# Patient Record
Sex: FEMALE | Race: WHITE
[De-identification: names, ages, dates, MRNs, and addresses within clinical notes are randomized per-mention and may not be internally consistent; named-entity substitution may affect disease eponyms.]

---

## 2020-06-11 ENCOUNTER — HOSPITAL ENCOUNTER (OUTPATIENT)
Dept: HOSPITAL 75 - LABNPT | Age: 40
End: 2020-06-11
Attending: OBSTETRICS & GYNECOLOGY
Payer: COMMERCIAL

## 2020-06-11 ENCOUNTER — HOSPITAL ENCOUNTER (INPATIENT)
Dept: HOSPITAL 75 - ER | Age: 40
Discharge: HOME | DRG: 817 | End: 2020-06-11
Attending: OBSTETRICS & GYNECOLOGY | Admitting: OBSTETRICS & GYNECOLOGY
Payer: COMMERCIAL

## 2020-06-11 VITALS — SYSTOLIC BLOOD PRESSURE: 94 MMHG | DIASTOLIC BLOOD PRESSURE: 56 MMHG

## 2020-06-11 VITALS — SYSTOLIC BLOOD PRESSURE: 103 MMHG | DIASTOLIC BLOOD PRESSURE: 73 MMHG

## 2020-06-11 VITALS — DIASTOLIC BLOOD PRESSURE: 79 MMHG | SYSTOLIC BLOOD PRESSURE: 126 MMHG

## 2020-06-11 VITALS — BODY MASS INDEX: 31.64 KG/M2 | WEIGHT: 178.57 LBS | HEIGHT: 62.99 IN

## 2020-06-11 VITALS — DIASTOLIC BLOOD PRESSURE: 70 MMHG | SYSTOLIC BLOOD PRESSURE: 106 MMHG

## 2020-06-11 VITALS — DIASTOLIC BLOOD PRESSURE: 56 MMHG | SYSTOLIC BLOOD PRESSURE: 94 MMHG

## 2020-06-11 VITALS — DIASTOLIC BLOOD PRESSURE: 58 MMHG | SYSTOLIC BLOOD PRESSURE: 96 MMHG

## 2020-06-11 VITALS — DIASTOLIC BLOOD PRESSURE: 74 MMHG | SYSTOLIC BLOOD PRESSURE: 119 MMHG

## 2020-06-11 VITALS — DIASTOLIC BLOOD PRESSURE: 73 MMHG | SYSTOLIC BLOOD PRESSURE: 103 MMHG

## 2020-06-11 VITALS — SYSTOLIC BLOOD PRESSURE: 109 MMHG | DIASTOLIC BLOOD PRESSURE: 75 MMHG

## 2020-06-11 VITALS — DIASTOLIC BLOOD PRESSURE: 68 MMHG | SYSTOLIC BLOOD PRESSURE: 122 MMHG

## 2020-06-11 VITALS — DIASTOLIC BLOOD PRESSURE: 75 MMHG | SYSTOLIC BLOOD PRESSURE: 121 MMHG

## 2020-06-11 VITALS — DIASTOLIC BLOOD PRESSURE: 74 MMHG | SYSTOLIC BLOOD PRESSURE: 115 MMHG

## 2020-06-11 DIAGNOSIS — O08.1: ICD-10-CM

## 2020-06-11 DIAGNOSIS — Z11.59: Primary | ICD-10-CM

## 2020-06-11 DIAGNOSIS — K66.1: ICD-10-CM

## 2020-06-11 DIAGNOSIS — O00.101: Primary | ICD-10-CM

## 2020-06-11 LAB
ALBUMIN SERPL-MCNC: 4.5 GM/DL (ref 3.2–4.5)
ALP SERPL-CCNC: 53 U/L (ref 40–136)
ALT SERPL-CCNC: 14 U/L (ref 0–55)
APTT PPP: YELLOW S
BACTERIA #/AREA URNS HPF: (no result) /HPF
BASOPHILS # BLD AUTO: 0 10^3/UL (ref 0–0.1)
BASOPHILS NFR BLD AUTO: 0 % (ref 0–10)
BILIRUB SERPL-MCNC: 0.4 MG/DL (ref 0.1–1)
BILIRUB UR QL STRIP: NEGATIVE
BUN/CREAT SERPL: 12
CALCIUM SERPL-MCNC: 9.7 MG/DL (ref 8.5–10.1)
CHLORIDE SERPL-SCNC: 108 MMOL/L (ref 98–107)
CO2 SERPL-SCNC: 21 MMOL/L (ref 21–32)
CREAT SERPL-MCNC: 0.99 MG/DL (ref 0.6–1.3)
EOSINOPHIL # BLD AUTO: 0.1 10^3/UL (ref 0–0.3)
EOSINOPHIL NFR BLD AUTO: 2 % (ref 0–10)
ERYTHROCYTE [DISTWIDTH] IN BLOOD BY AUTOMATED COUNT: 14 % (ref 10–14.5)
FIBRINOGEN PPP-MCNC: (no result) MG/DL
GFR SERPLBLD BASED ON 1.73 SQ M-ARVRAT: > 60 ML/MIN
GLUCOSE SERPL-MCNC: 100 MG/DL (ref 70–105)
GLUCOSE UR STRIP-MCNC: NEGATIVE MG/DL
HCT VFR BLD CALC: 38 % (ref 35–52)
HGB BLD-MCNC: 13.1 G/DL (ref 11.5–16)
KETONES UR QL STRIP: NEGATIVE
LEUKOCYTE ESTERASE UR QL STRIP: (no result)
LYMPHOCYTES # BLD AUTO: 2 X 10^3 (ref 1–4)
LYMPHOCYTES NFR BLD AUTO: 33 % (ref 12–44)
MANUAL DIFFERENTIAL PERFORMED BLD QL: NO
MCH RBC QN AUTO: 30 PG (ref 25–34)
MCHC RBC AUTO-ENTMCNC: 34 G/DL (ref 32–36)
MCV RBC AUTO: 88 FL (ref 80–99)
MONOCYTES # BLD AUTO: 0.7 X 10^3 (ref 0–1)
MONOCYTES NFR BLD AUTO: 12 % (ref 0–12)
NEUTROPHILS # BLD AUTO: 3.3 X 10^3 (ref 1.8–7.8)
NEUTROPHILS NFR BLD AUTO: 53 % (ref 42–75)
NITRITE UR QL STRIP: NEGATIVE
PH UR STRIP: 6 [PH] (ref 5–9)
PLATELET # BLD: 392 10^3/UL (ref 130–400)
PMV BLD AUTO: 9.4 FL (ref 7.4–10.4)
POTASSIUM SERPL-SCNC: 4.1 MMOL/L (ref 3.6–5)
PROT SERPL-MCNC: 7.7 GM/DL (ref 6.4–8.2)
PROT UR QL STRIP: NEGATIVE
RBC #/AREA URNS HPF: (no result) /HPF
SODIUM SERPL-SCNC: 139 MMOL/L (ref 135–145)
SP GR UR STRIP: >=1.03 (ref 1.02–1.02)
SQUAMOUS #/AREA URNS HPF: (no result) /HPF
WBC # BLD AUTO: 6.1 10^3/UL (ref 4.3–11)
WBC #/AREA URNS HPF: (no result) /HPF

## 2020-06-11 PROCEDURE — 84702 CHORIONIC GONADOTROPIN TEST: CPT

## 2020-06-11 PROCEDURE — 87635 SARS-COV-2 COVID-19 AMP PRB: CPT

## 2020-06-11 PROCEDURE — 96374 THER/PROPH/DIAG INJ IV PUSH: CPT

## 2020-06-11 PROCEDURE — 76817 TRANSVAGINAL US OBSTETRIC: CPT

## 2020-06-11 PROCEDURE — 85025 COMPLETE CBC W/AUTO DIFF WBC: CPT

## 2020-06-11 PROCEDURE — 80053 COMPREHEN METABOLIC PANEL: CPT

## 2020-06-11 PROCEDURE — 0UT74ZZ RESECTION OF BILATERAL FALLOPIAN TUBES, PERCUTANEOUS ENDOSCOPIC APPROACH: ICD-10-PCS | Performed by: OBSTETRICS & GYNECOLOGY

## 2020-06-11 PROCEDURE — 76801 OB US < 14 WKS SINGLE FETUS: CPT

## 2020-06-11 PROCEDURE — 0WCG4ZZ EXTIRPATION OF MATTER FROM PERITONEAL CAVITY, PERCUTANEOUS ENDOSCOPIC APPROACH: ICD-10-PCS | Performed by: OBSTETRICS & GYNECOLOGY

## 2020-06-11 PROCEDURE — 87088 URINE BACTERIA CULTURE: CPT

## 2020-06-11 PROCEDURE — 81000 URINALYSIS NONAUTO W/SCOPE: CPT

## 2020-06-11 PROCEDURE — 96376 TX/PRO/DX INJ SAME DRUG ADON: CPT

## 2020-06-11 PROCEDURE — 84703 CHORIONIC GONADOTROPIN ASSAY: CPT

## 2020-06-11 PROCEDURE — 88305 TISSUE EXAM BY PATHOLOGIST: CPT

## 2020-06-11 PROCEDURE — 96361 HYDRATE IV INFUSION ADD-ON: CPT

## 2020-06-11 PROCEDURE — 0UB14ZZ EXCISION OF LEFT OVARY, PERCUTANEOUS ENDOSCOPIC APPROACH: ICD-10-PCS | Performed by: OBSTETRICS & GYNECOLOGY

## 2020-06-11 PROCEDURE — 36415 COLL VENOUS BLD VENIPUNCTURE: CPT

## 2020-06-11 NOTE — NUR
DISCHARGE INSTRUCTIONS REVIEWED WITH COPY TO PT. SPOUSE LEFT TO GET PRESCRIPTION. WILL MEET 
US DOWNSTAIRS AT ED ENTRANCE WHEN RETURNS. STATES UNDERSTANDING OF ALL INSTRUCTIONS AND NEED 
TO F/U AS INSTRUCTED. PT C/O SLIGHT NAUSEA AND REQUESTS MEDICATION FOR HOME IF NEEDED.

## 2020-06-11 NOTE — XMS REPORT
CCD document using C-CDA

                             Created on: 2019



Suzy Howard

External Reference #: [id number]

: 1980

Sex: Female



Demographics





                          Address                   [address] [place]  [address]

 

                          Home Phone                [phone]

 

                          Preferred Language        English

 

                          Marital Status            

 

                          Jehovah's witness Affiliation     Unknown

 

                          Race                      White

 

                          Ethnic Group              Not  or 





Author





                          Author                    Suzy Shipman D.O.

 

                          Organization              [facility]

 

                          Address                   [address] [place]  [address]



 

                          Phone                     [phone]







Care Team Providers





                    Care Team Member Name Role                Phone

 

                          PP                        Unavailable

 

                          CCM                       Unavailable



                                            



Summary Purpose

          Interface Exchange                                                    
               



Insurance Providers

                      



                    Payer name                   Policy type / Coverage type    

               

Covered party ID                   Effective Begin Date                   

Effective End Date                

 

                    [facility]                   Commercial Insurance 

                  

[id number]                   2013                   Unknown                



                                                                              



Family History

          Family History data not found                                         
                



Social History

                      



                    Social History Element                   Codes              

     Description    

                                        Effective Dates                

 

                    Tobacco history                   SNOMED CT: [id number]      

             Never 

smoker                                  2011                



                                                                                
       



Allergies, Adverse Reactions, Alerts

                      



                Substance                   Reaction                   Codes    

               

Entered Date                   Inactivated Date                   Status        

       

 

                                                            * NO KNOWN FOOD FAM

RGIES                                   

                                    Unknown                   2011        

           No 

Inactive Date                           Active                

 

                                                            * NO KNOWN ENVIRONME

NTAL ALLERGIES                          

                                        Unknown                   2011    

          

                          No Inactive Date                   Active             

   

 

                                                            * NO KNOWN DRUG FAM

RGIES                                   

                                    Unknown                   2011        

           No 

Inactive Date                           Active                



                                                                                
                 



Past Medical History

                      



                    Illness                   Codes                   Condition 

Status              

                          Onset Date                   Resolved Date            

    

 

                                              FLU VACCINE                       

              ICD-9: 

V04.81                   Active                   2013                   

Unknown                

 

                                              GERD                              

       ICD-9: 530.81      

                    Active                   2012                   Unknow

n       

        

 

                                              LYMPHADENOPATHY                   

                  ICD-9: 

785.6                   Active                   2012                   

Unknown                

 

                                              Hypertension                      

               Unknown    

                    Active                   2011                   Unknow

n     

          

 

                                              CEPHALGIA                         

            ICD-9: 784.0  

                    Active                   2011                   Unknow

n   

            

 

                                              HYPERTENSION                      

               ICD-9: 

401.9                   Active                   2011                   

Unknown                

 

                                              MALAISE AND FATIGUE               

                      ICD-

9: 780.79                   Active                    2011                

 

                                        Unknown                

 

                                              Lupus                             

        Unknown           

                    Active                   2011                   Unknow

n            

   

 

                                              INSECT BITE HIP/LEG               

                      ICD-

9: 916.4                   Active                    2011                 

 

                                        Unknown                

 

                                              SCARLET FEVER                     

                ICD-9: 

034.1                   Active                   2011                   

Unknown                

 

                                                            SYST LUPUS ERYTHEMAT

OSUS                                    

                    ICD-9: 710.0                   Active                                 

                                        Unknown                



                                                                                
                                                                                
                          



Problems

                      



                    Condition                   Codes                   Effectiv

e Dates             

                                        Condition Status                

 

                                              FLU VACCINE                       

              ICD-9: 

V04.81                    2013                   Active                

 

                                              GERD                              

       ICD-9: 530.81      

                          2012                   Active                

 

                                              LYMPHADENOPATHY                   

                  ICD-9: 

785.6                     2012                   Active                

 

                                              Hypertension                      

               Unknown    

                          2011                   Active                

 

                                              CEPHALGIA                         

            ICD-9: 784.0  

                          2011                   Active                

 

                                              HYPERTENSION                      

               ICD-9: 

401.9                     2011                   Active                

 

                                              MALAISE AND FATIGUE               

                      ICD-

9: 780.79                   2011                   Active                

 

                                              Lupus                             

        Unknown           

                          2011                   Active                

 

                                              INSECT BITE HIP/LEG               

                      ICD-

9: 916.4                   2011                   Active                

 

                                              SCARLET FEVER                     

                ICD-9: 

034.1                     2011                   Active                

 

                                                            SYST LUPUS ERYTHEMAT

OSUS                                    

                    ICD-9: 710.0                   2011                   

Active              

 



                                                                                
                                                                                
                          



Medications

                      



                    Medication                   Codes                   Instruc

tions               

                    Start Date                   Stop Date                   Sta

tus              

                                        Fill Instructions                

 

                                              prednisone 20 mg Tab              

                       

RxNorm: 903990                   1 Tablet(s) PO BID                   2011                   Inactive                       

  

         

 

                                              Plaquenil 200 mg Tab              

                       

RxNorm: 503991                   1 Tablet(s) PO QD                   No Start 

Date                                       Active                               

   

 

                                              amoxicillin 500 mg Tab            

                         

RxNorm: 080260                   2 Tablet(s) PO BID                   No Start 

Date                   2011                   Inactive                    

              



                                                                                
                 



Medication Administered

          No Medication Administered data                                       
                            



Immunizations

                      



                Vaccine                   Codes                   Date          

         Status 

              

 

                    Influenza                   CVX: 141                                  

                                        completed                

 

                    Pediatric Influenza                   CVX: 141              

     2013     

                                        completed                

 

                    Tetanus, Diphtheria, Pertussis                   Unknown    

               

2011                              completed                



                                                                                
                 



Assessments

                      



                    Condition                   Codes                   Effectiv

e Dates             

  

 

                    FLU VACCINE                   ICD-9: V04.81                 

  2013        

       

 

                    GERD                   ICD-9: 530.81                                  



 

                    LYMPHADENOPATHY                   ICD-9: 785.6              

     2012     

          

 

                    CEPHALGIA                   ICD-9: 784.0                   0

2011           

    

 

                    HYPERTENSION                   ICD-9: 401.9                 

  2011        

       

 

                    SYST LUPUS ERYTHEMATOSUS                   ICD-9: 710.0     

              

2011                

 

                    MALAISE AND FATIGUE                   ICD-9: 780.79         

          2011

               

 

                    INSECT BITE HIP/LEG                   ICD-9: 916.4          

         2011 

              

 

                    SCARLET FEVER                   ICD-9: 034.1                

   2011       

        



                                                                    



Reason For Visit

                      



                    Reason For Visit                   Effective Dates          

         Notes      

         

 

                    injection(s)                   2013                   

                

 

                    gastroesophageal reflux                   2012        

                    

      

 

                    high blood pressure                   2011            

                    

  

 

                    follow up                   2011                   fro

m [facility]        

       



                                                                              



Results

                      



                    Observation                   Observation Code              

     Item           

                    Item Code                   Result                   Date   

             

 

                    COMPREHENSIVE METABOLIC                   06866             

      AST           

                                        19 U/L                   2012     

           

 

                    COMPREHENSIVE METABOLIC                   01987             

      ALT           

                                        13 IU/L                   2012    

            

 

                    COMPREHENSIVE METABOLIC                   31471             

      BUN           

                                        14 MG/DL                   2012   

             

 

                    COMPREHENSIVE METABOLIC                   80151             

      ALBUMIN       

                                        4.8 GM/DL                   2012  

          

   

 

                    COMPREHENSIVE METABOLIC                   14206             

      CHLORIDE      

                                        107 MMOL/L                   2012 

         

     

 

                    COMPREHENSIVE METABOLIC                   26039             

      BILI TOT      

                                        0.6 MG/DL                   2012  

         

    

 

                    COMPREHENSIVE METABOLIC                   69868             

      ALK PHOS      

                                        46 U/L                   2012     

         

 

 

                    COMPREHENSIVE METABOLIC                   44791             

      SODIUM        

                                        140 MMOL/L                   2012 

           

   

 

                    COMPREHENSIVE METABOLIC                   40828             

      CREATININE    

                                        0.82 MG/DL                   2012 

       

       

 

                    COMPREHENSIVE METABOLIC                   11621             

      CALCIUM       

                                        9.5 MG/DL                   2012  

          

   

 

                    COMPREHENSIVE METABOLIC                   54561             

      POTASSIUM     

                                        3.8 MMOL/L                   2012 

        

      

 

                    COMPREHENSIVE METABOLIC                   83367             

      PROT TOT      

                                        6.9 GM/DL                   2012  

         

    

 

                    COMPREHENSIVE METABOLIC                   94970             

      Glucose       

                                        88 MG/DL                   2012   

          

  

 

                    COMPREHENSIVE METABOLIC                   46790             

      BICARB        

                                        26 MMOL/L                   2012  

           

  

 

                    COMPREHENSIVE METABOLIC                   48388             

      ANION GAP     

                                        7 MEQ/L                   2012    

        

   

 

                    COMPLETE BLOOD COUNT                   5055798              

     WBC            

                                        3.6 10e9/L                   2012 

               

 

                    COMPLETE BLOOD COUNT                   5088805              

     RBC            

                                        3.89 10e12/L                   

2              

 

 

                    COMPLETE BLOOD COUNT                   [id number]              

     HGB            

                                        11.8 g/dL                   2012  

              

 

                    COMPLETE BLOOD COUNT                   [id number]              

     HCT DET        

                                        35.3 %                   2012     

           

 

                    COMPLETE BLOOD COUNT                   9027829              

     MCV            

                                        90.7 fL                   2012    

            

 

                    COMPLETE BLOOD COUNT                   1190165              

     MCH            

                                        30.3 pg                   2012    

            

 

                    COMPLETE BLOOD COUNT                   3196876              

     MCHC           

                                        33.4 g/dL                   2012  

              

 

                    COMPLETE BLOOD COUNT                   [id number]              

     PLT            

                                        331 10e9/L                   2012 

               

 

                    COMPLETE BLOOD COUNT                   1329155              

     MPV            

                                        9.7 fL                   2012     

           

 

                    COMPLETE BLOOD COUNT                   [id number]              

     CATRINA %          

                                        54.0 %                   2012     

           

 

                    COMPLETE BLOOD COUNT                   4338511              

     LY %           

                                        32.8 %                   2012     

           

 

                    COMPLETE BLOOD COUNT                   [id number]              

     MON %          

                                        11.0 %                   2012     

           

 

                    COMPLETE BLOOD COUNT                   3943115              

     EOS  %         

                                        1.9 %                   2012      

          

 

                    COMPLETE BLOOD COUNT                   [id number]              

     BASO %         

                                        0.3 %                   2012      

          

 

                    COMPLETE BLOOD COUNT                   4856716              

     RDW            

                                        12.9 %                   2012     

           

 

                    COMPLETE BLOOD COUNT                   [id number]              

     ABS CATRINA        

                                        1.94 10e9/L                   2012

           

    

 

                    COMPLETE BLOOD COUNT                   1565301              

     ABS LYMPH      

                                        1.18 10e9/L                   2012

         

      

 

                    COMPLETE BLOOD COUNT                   [id number]              

     ABS MONO       

                                        0.40 10e9/L                   2012

          

     

 

                    COMPLETE BLOOD COUNT                   5849460              

     ABS EOS        

                                        0.07 10e9/L                   2012

           

    

 

                    COMPLETE BLOOD COUNT                   8665687              

     ABS BASO       

                                        0.01 10e9/L                   2012

          

     

 

                    COMPLETE BLOOD COUNT                   3231005              

     RDW-SD         

                                        42.3 fL                   2012    

            

 

                GFR CALC                   7043779                   GFR AA     

                

                          >60 ML/MIN                   2012               

 

 

                    GFR CALC                   7680692                   GFR NON

-AA                 

                                        >60 ML/MIN                   2012 

               

 

                FREE T4                   39038                   FREE T4       

                

                          1.27 NG/DL                   2011               

 

 

                    COMPREHENSIVE METABOLIC                   04886             

      AST           

                                        20 U/L                   2011     

           

 

                    COMPREHENSIVE METABOLIC                   70325             

      ALT           

                                        17 IU/L                   2011    

            

 

                    COMPREHENSIVE METABOLIC                   80987             

      BUN           

                                        14 MG/DL                   2011   

             

 

                    COMPREHENSIVE METABOLIC                   19768             

      ALBUMIN       

                                        4.9 GM/DL                   2011  

          

   

 

                    COMPREHENSIVE METABOLIC                   04756             

      CHLORIDE      

                                        105 MMOL/L                   2011 

         

     

 

                    COMPREHENSIVE METABOLIC                   45736             

      BILI TOT      

                                        0.6 MG/DL                   2011  

         

    

 

                    COMPREHENSIVE METABOLIC                   27747             

      ALK PHOS      

                                        40 U/L                   2011     

         

 

 

                    COMPREHENSIVE METABOLIC                   46841             

      SODIUM        

                                        137 MMOL/L                   2011 

           

   

 

                    COMPREHENSIVE METABOLIC                   09575             

      CREATININE    

                                        0.79 MG/DL                   2011 

       

       

 

                    COMPREHENSIVE METABOLIC                   19760             

      CALCIUM       

                                        9.3 MG/DL                   2011  

          

   

 

                    COMPREHENSIVE METABOLIC                   85145             

      POTASSIUM     

                                        4.0 MMOL/L                   2011 

        

      

 

                    COMPREHENSIVE METABOLIC                   07599             

      PROT TOT      

                                        7.2 GM/DL                   2011  

         

    

 

                    COMPREHENSIVE METABOLIC                   14502             

      Glucose       

                                        90 MG/DL                   2011   

          

  

 

                    COMPREHENSIVE METABOLIC                   00980             

      BICARB        

                                        23 MMOL/L                   2011  

           

  

 

                    COMPREHENSIVE METABOLIC                   07854             

      ANION GAP     

                                        9 MEQ/L                   2011    

        

   

 

                    THYROID STIMULATING HORMONE                   25684         

          TSH       

                                        0.793 uIU/ML                   

1         

      

 

                    COMPLETE BLOOD COUNT                   88798                

   WBC              

                                        6.7 10e9/L                   2011 

               

 

                    COMPLETE BLOOD COUNT                   02751                

   RBC              

                                        4.26 10e12/L                   

1                

 

                    COMPLETE BLOOD COUNT                   61074                

   HGB              

                                        12.7 g/dL                   2011  

              

 

                    COMPLETE BLOOD COUNT                   02681                

   HCT DET          

                                        36.9 %                   2011     

           

 

                    COMPLETE BLOOD COUNT                   31031                

   MCV              

                                        86.6 fL                   2011    

            

 

                    COMPLETE BLOOD COUNT                   98426                

   MCH              

                                        29.8 pg                   2011    

            

 

                    COMPLETE BLOOD COUNT                   64968                

   MCHC             

                                        34.4 g/dL                   2011  

              

 

                    COMPLETE BLOOD COUNT                   83543                

   PLT              

                                        318 10e9/L                   2011 

               

 

                    COMPLETE BLOOD COUNT                   40988                

   MPV              

                                        9.4 fL                   2011     

           

 

                    COMPLETE BLOOD COUNT                   42907                

   [place] %            

                                        64.3 %                   2011     

           

 

                    COMPLETE BLOOD COUNT                   81449                

   LY %             

                                        25.0 %                   2011     

           

 

                    COMPLETE BLOOD COUNT                   32180                

   MON %            

                                        9.1 %                   2011      

          

 

                    COMPLETE BLOOD COUNT                   15628                

   EOS  %           

                                        1.5 %                   2011      

          

 

                    COMPLETE BLOOD COUNT                   63438                

   BASO %           

                                        0.1 %                   2011      

          

 

                    COMPLETE BLOOD COUNT                   82894                

   RDW              

                                        13.2 %                   2011     

           

 

                    COMPLETE BLOOD COUNT                   96494                

   ABS [place]          

                                        4.31 10e9/L                   2011

             

  

 

                    COMPLETE BLOOD COUNT                   95777                

   ABS LYMPH        

                                        1.68 10e9/L                   2011

           

    

 

                    COMPLETE BLOOD COUNT                   55702                

   ABS MONO         

                                        0.61 10e9/L                   2011

            

   

 

                    COMPLETE BLOOD COUNT                   61071                

   ABS EOS          

                                        0.10 10e9/L                   2011

             

  

 

                    COMPLETE BLOOD COUNT                   76011                

   ABS BASO         

                                        0.01 10e9/L                   2011

            

   

 

                    COMPLETE BLOOD COUNT                   32300                

   RDW-SD           

                                        41.2 fL                   2011    

            

 

                GFR CALC                   [id number]                   GFR AA     

                

                          >60 ML/MIN                   2011               

 

 

                    GFR CALC                   [id number]                   GFR NON

-AA                 

                                        >60 ML/MIN                   2011 

               



                                                                                
                                                                   



Review of Systems

                      



                    System                   Result                   Effective 

Dates               



 

                    Constitutional                   fever                                

  

 

                    Ears/Nose/Throat/Neck                   No nasal discharge  

                 

2012                

 

                    Ears/Nose/Throat/Neck                   No otalgia          

         2012 

              

 

                    Ears/Nose/Throat/Neck                   sinus congestion    

               

2012                

 

                    Ears/Nose/Throat/Neck                   No sore throat      

             

2012                

 

                    Cardiovascular                   No chest pain/pressure     

              

2012                

 

                    Cardiovascular                   No hypertension            

       2012   

            

 

                    Respiratory                   No cough                                

  

 

                    Respiratory                   No chest congestion           

        2012  

             

 

                    Respiratory                   No cigarette smoking          

         2012 

              

 

                    Gastrointestinal                   gastroesophageal reflux  

                 

2012                

 

                    Gastrointestinal                   No vomiting              

     2012     

          

 

                    Gastrointestinal                   No nausea                

   2012       

        

 

                    Gastrointestinal                   constipation             

      2012    

           

 

                    Genitourinary/Nephrology                   No pregnancy     

              

2012                

 

                    Dermatologic                   No rash                                

  

 

                    Dermatologic                   No sores                               

   

 

                    Cardiovascular                   hypertension               

    2011      

         

 

                    Neurologic                   No memory loss                 

  2011        

       

 

                    Ears/Nose/Throat/Neck                   dental pain         

          2011

               

 

                    Constitutional                   fatigue                   0

2011           

    

 

                    Respiratory                   No asthma                               

   

 

                    Respiratory                   No pleuritic pain             

      2011    

           

 

                    Respiratory                   No productive sputum          

         2011 

              

 

                    Respiratory                   No cough                   2011             

  

 

                    Respiratory                   No dyspnea                   0

2011           

    

 

                    Respiratory                   No wheezing                   

2011          

     

 

                    Gastrointestinal                   No hemorrhoids           

        2011  

             

 

                    Gastrointestinal                   No hepatitis             

      2011    

           

 

                    Gastrointestinal                   No abdominal pain        

           

2011                

 

                    Gastrointestinal                   No constipation          

         2011 

              

 

                    Gastrointestinal                   No diarrhea              

     2011     

          

 

                    Gastrointestinal                   No gastroesophageal reflu

x                   

2011                

 

                    Gastrointestinal                   No melena                

   2011       

        

 

                    Gastrointestinal                   No nausea                

   2011       

        

 

                    Gastrointestinal                   No vomiting              

     2011     

          

 

                    Genitourinary/Nephrology                   No dysuria       

            

2011                

 

                    Genitourinary/Nephrology                   No nocturia      

             

2011                

 

                          Genitourinary/Nephrology                   No urinary 

incontinence              

                                        2011                

 

                    Musculoskeletal                   No muscle weakness        

           

2011                

 

                    Musculoskeletal                   No myalgias               

    2011      

         

 

                    Musculoskeletal                   No stiffness              

     2011     

          

 

                    Musculoskeletal                   No swelling               

    2011      

         

 

                    Dermatologic                   No rash                   2011             

  

 

                    Dermatologic                   No scar                   2011             

  

 

                    Psychiatric                   No anxiety                   0

2011           

    

 

                    Psychiatric                   No depression                 

  2011        

       

 

                    Endocrine                   No goiter                   2011              

 

 

                    Endocrine                   No hyperglycemia                

   2011       

        

 

                    Endocrine                   No hypoglycemia                 

  2011        

       

 

                    Psychiatric                   stress                                  



 

                    Dermatologic                   arthropod bite               

    2011      

         

 

                    Dermatologic                   rash                   2011                

 

                    Constitutional                   No night sweats            

       2011   

            

 

                    Constitutional                   No fatigue                 

  2011        

       

 

                    Constitutional                   No fever                   

2011          

     

 

                    Constitutional                   No insomnia                

   2011       

        

 

                    Constitutional                   No weight loss             

      2011    

           



                                                                    



Physical Exam

                      



                    Exam Name                   System Name                   It

em Name             

                    Status                   Result                   Effective 

Dates          

                                        Notes                

 

                    Full Exam - General                   Constitutional        

            general 

appearance                   Overall:                   well nourished          

                          2012                   None                

 

                    Full Exam - General                   Constitutional        

            general 

appearance                   Overall:                   well developed          

                          2012                   None                

 

                    Full Exam - General                   Constitutional        

            general 

appearance                   Overall:                   in no acute distress    

                          2012                   None                

 

                    Full Exam - General                   Ears/Nose/Throat      

             

otoscopic exam                   Overall:                   external auditory 

canals clear                   2012                   None                

 

                    Full Exam - General                   Ears/Nose/Throat      

             

otoscopic exam                   Overall:                   tympanic membranes 

clear                     2012                   None                

 

                    Full Exam - General                   Ears/Nose/Throat      

             

lips/teeth/gingiva                   Overall:                   benign lips     

                          2012                   None                

 

                    Full Exam - General                   Ears/Nose/Throat      

             

lips/teeth/gingiva                   Overall:                   normal dentition

                          2012                   None                

 

                    Full Exam - General                   Ears/Nose/Throat      

             

lips/teeth/gingiva                   Overall:                   benign gingiva  

                          2012                   None                

 

                    Full Exam - General                   Ears/Nose/Throat      

             oral 

cavity/pharynx/larynx                    Overall:                   oral mucosa 

clear                     2012                   None                

 

                    Full Exam - General                   Ears/Nose/Throat      

             oral 

cavity/pharynx/larynx                    Overall:                   tonsils 

benign                    2012                   None                

 

                    Full Exam - General                   Ears/Nose/Throat      

             oral 

cavity/pharynx/larynx                    Overall:                   

oropharyngeal mucosa clear                   2012                   None  

             

 

                    Full Exam - General                   Respiratory           

        auscultation

                          Overall:                   breath sounds clear bilater

ally    

                          2012                   None                

 

                    Full Exam - General                   Respiratory           

        respiratory 

effort/rhythm                   Overall:                   no retractions       

                          2012                   None                

 

                    Full Exam - General                   Respiratory           

        respiratory 

effort/rhythm                   Overall:                   normal rate          

                          2012                   None                

 

                    Full Exam - General                   Cardiovascular        

           

auscultation of heart                   Overall:                   regular rate 

                          2012                   None                

 

                    Full Exam - General                   Cardiovascular        

           

auscultation of heart                   Overall:                   normal heart 

sounds                    2012                   None                

 

                    Full Exam - General                   Lymphatic             

      neck nodes    

                          Overall:                   anterior cervical chain minh

ign         

                          2012                   None                

 

                    Full Exam - General                   Lymphatic             

      neck nodes    

                          Overall:                   posterior cervical chain be

nign        

                          2012                   None                

 

                    Full Exam - General                   Lymphatic             

      axilla/arm 

nodes                     Left axillary:                   a normal exam        

  

                          2012                   None                

 

                    Full Exam - General                   Lymphatic             

      axilla/arm 

nodes                     Right axillary:                   number of palpable 

nodes: 1                   2012                   None                

 

                    Full Exam - General                   Lymphatic             

      axilla/arm 

nodes                     Right axillary:                   size (cm): 0.5      

  

                          2012                   None                

 

                    Full Exam - General                   Lymphatic             

      axilla/arm 

nodes                     Right axillary:                   tender              

  

                          2012                   None                

 

                    Full Exam - General                   Lymphatic             

      axilla/arm 

nodes                     Right axillary:                   mobile              

  

                          2012                   None                

 

                    Full Exam - General                   Constitutional        

            general 

appearance                   Overall:                   in no acute distress    

                          2011                   None                

 

                    Full Exam - General                   Constitutional        

            general 

appearance                   Overall:                   well developed          

                          2011                   None                

 

                    Full Exam - General                   Constitutional        

            general 

appearance                   Overall:                   well nourished          

                          2011                   None                

 

                    Full Exam - General                   Neurologic            

       mental status

                    Overall:                   alert                   

1 

                                        None                

 

                    Full Exam - General                   Neurologic            

       mental status

                    Overall:                   oriented                   

2011                              None                

 

                    Full Exam - General                   Psychiatric           

        mood and 

affect                   Affect:                   flat                   

2011                              None                

 

                    Full Exam - General                   Psychiatric           

        mood and 

affect                   Mood:                   normal                   

2011                              None                

 

                    Full Exam - General                   Musculoskeletal       

            spine, 

ribs and pelvis                   Spine:                    tender @ cervical 

spine                     2011                   None                

 

                    Full Exam - General                   Respiratory           

        auscultation

                          Right upper lung field:                   a normal exa

m       

                          2011                   None                

 

                    Full Exam - General                   Respiratory           

        auscultation

                          Right middle lung field:                   a normal ex

am      

                          2011                   None                

 

                    Full Exam - General                   Respiratory           

        auscultation

                          Right lower lung field:                   a normal exa

m       

                          2011                   None                

 

                    Full Exam - General                   Respiratory           

        auscultation

                          Left lower lung field:                   a normal exam

        

                          2011                   None                

 

                    Full Exam - General                   Respiratory           

        auscultation

                          Overall:                   breath sounds clear bilater

ally    

                          2011                   None                

 

                    Full Exam - General                   Respiratory           

        auscultation

                          Left upper lung field:                   a normal exam

        

                          2011                   None                

 

                    Full Exam - General                   Respiratory           

        auscultation

                    Diffuse:                   a normal exam                   

2011                              None                

 

                    Full Exam - General                   Cardiovascular        

           

auscultation of heart                   Overall:                   no murmurs   

                          2011                   None                

 

                    Full Exam - General                   Cardiovascular        

           

auscultation of heart                   Overall:                   regular rate 

                          2011                   None                

 

                    Full Exam - General                   Cardiovascular        

           

auscultation of heart                   Overall:                   normal heart 

sounds                    2011                   None                

 

                    Full Exam - General                   Cardiovascular        

           

auscultation of heart                   S1:                       a normal exam 

    

                          2011                   None                

 

                    Full Exam - General                   Cardiovascular        

           

auscultation of heart                   S2:                       a normal exam 

    

                          2011                   None                

 

                    Full Exam - General                   Cardiovascular        

           

auscultation of heart                   Rhythm:                   regular rhythm

                          2011                   None                

 

                    Full Exam - General                   Cardiovascular        

           

auscultation of heart                   Rate:                     regular rate  

  

                          2011                   None                

 

                    Full Exam - General                   Cardiovascular        

           

auscultation of heart                   S3 (ventricular gallop):                

                    present                   2011                   None 

               

 

                    Full Exam - General                   Psychiatric           

        mood and 

affect                    Overall:                   normal mood and affect     

 

                          2011                   None                

 

                    Full Exam - General                   Respiratory           

        auscultation

                          Overall:                   breath sounds clear bilater

ally    

                          2011                   None                

 

                    Full Exam - General                   Respiratory           

        auscultation

                    Diffuse:                   a normal exam                   

2011                              None                

 

                    Full Exam - General                   Respiratory           

        auscultation

                          Left upper lung field:                   a normal exam

        

                          2011                   None                

 

                    Full Exam - General                   Respiratory           

        auscultation

                          Left lower lung field:                   a normal exam

        

                          2011                   None                

 

                    Full Exam - General                   Respiratory           

        auscultation

                          Right upper lung field:                   a normal exa

m       

                          2011                   None                

 

                    Full Exam - General                   Respiratory           

        auscultation

                          Right middle lung field:                   a normal ex

am      

                          2011                   None                

 

                    Full Exam - General                   Respiratory           

        auscultation

                          Right lower lung field:                   a normal exa

m       

                          2011                   None                

 

                    Full Exam - General                   Cardiovascular        

           

auscultation of heart                   Overall:                   regular rate 

                          2011                   None                

 

                    Full Exam - General                   Cardiovascular        

           

auscultation of heart                   Overall:                   normal heart 

sounds                    2011                   None                

 

                    Full Exam - General                   Cardiovascular        

           

auscultation of heart                   Overall:                   no murmurs   

                          2011                   None                

 

                    Full Exam - General                   Cardiovascular        

           

auscultation of heart                   Rate:                     regular rate  

  

                          2011                   None                

 

                    Full Exam - General                   Cardiovascular        

           

auscultation of heart                   Rhythm:                   regular rhythm

                          2011                   None                

 

                    Full Exam - General                   Cardiovascular        

           

auscultation of heart                   S1:                       a normal exam 

    

                          2011                   None                

 

                    Full Exam - General                   Cardiovascular        

           

auscultation of heart                   S2:                       a normal exam 

    

                          2011                   None                

 

                    Full Exam - General                   Constitutional        

            general 

appearance                   Overall:                   well nourished          

                          2011                   None                

 

                    Full Exam - General                   Constitutional        

            general 

appearance                   Overall:                   well developed          

                          2011                   None                

 

                    Full Exam - General                   Constitutional        

            general 

appearance                   Overall:                   in no acute distress    

                          2011                   None                

 

                    Full Exam - General                   Neurologic            

       mental status

                    Overall:                   alert                   

1 

                                        None                

 

                    Full Exam - General                   Neurologic            

       mental status

                    Overall:                   oriented                   

2011                              None                

 

                    Full Exam - General                   Cardiovascular        

           

auscultation of heart                   S3 (ventricular gallop):                

                    present                   2011                   None 

               

 

                    Full Exam - General                   Integument            

       inspection of

skin                   Dermatitis:                   erythema                   

2011                              scarletina like rash to groins, axilla, 

under 

breasts                

 

                    Full Exam - General                   Integument            

       inspection of

skin                   Location:                   right leg                   

2011                              inner thigh with approx 2cm ecchymotic l

ooking area

with pustule and induratin and surrounding erythema                



                                                                              



Procedures

                      



                    Procedure                   Codes                   Date    

            

 

                                                            FLU VACCINE 3 YRS & 

> IM UP 66                              

                          CPT-4: 98885                   2013             

   

 

                                              IMMUNIZATION ADMIN                

                     CPT-

4: 78520                                2013                

 

                                              ROUTINE VENIPUNCTURE              

                       

CPT-4: 98969                            2012                

 

                                                            COMPREHEN METABOLIC 

PANEL                                   

                          CPT-4: 87262                   2012             

   

 

                                                            COMPLETE CBC W/AUTO 

DIFF WBC                                

                          CPT-4: 25178                   2012             

   

 

                                              ROUTINE VENIPUNCTURE              

                       

CPT-4: 37143                            2011                

 

                                              ASSAY OF FREE THYROXINE           

                          

CPT-4: 41274                            2011                

 

                                                            ASSAY THYROID STIM H

ORMONE                                  

                          CPT-4: 51441                   2011             

   

 

                                                            COMPREHEN METABOLIC 

PANEL                                   

                          CPT-4: 54270                   2011             

   

 

                                                            COMPLETE CBC W/AUTO 

DIFF WBC                                

                          CPT-4: 73264                   2011             

   



                                                                                
                                                                                
      



Vital Signs

                      



                          Date                      Vital                

 

                          2012                                       Blood

 Pressure 1: 124/82 Code: 

8480-6                                                         BMI: 25.5 Code: 

63186-5                                                         Heart Rate 1: 68

bpm                                                         Height: 5'4"        

                                                            Temperature: 37.2 (C

) / 99.0 (F)

                                                            Weight: 151 lbs     

    

                         

 

                          2011                                       Blood

 Pressure 1: 116/78 Code: 

8480-6                                                         Heart Rate 1: 72 

bpm                                  

 

                          2011                                       Blood

 Pressure 1: 136/80 Code: 

8480-6                                                         BMI: 27.5 Code: 

46447-1                                                         Heart Rate 1: 60

bpm                                                         Height: 5'4"        

                                                            Temperature: 36.6 (C

) / 97.8 (F)

                                                            Weight: 163 lbs     

    

                         

 

                          2011                                       Blood

 Pressure 1: 128/80 Code: 

8480-6                                                         Heart Rate 1: 76 

bpm                                                         Temperature: 36.7 

(C) / 98.0 (F)                                                         Weight: 

160 lbs                                   



                                                                                
                                     



Functional Status

          No Functional Status data                                             
            



History of Present Illness

                      



                    Symptom Name                   Status                   Resu

lt                  

                          Effective Date                   Notes                

 

                    gastroesophageal reflux                   Quality           

        constant    

                          2012                   None                

 

                    gastroesophageal reflux                   Quality           

        heartburn   

                          2012                   None                

 

                          gastroesophageal reflux                   Onset and Re

solution                  

                    ongoing                   2012                   None 

               

 

                    gastroesophageal reflux                   Onset of Symptom  

                 

during adulthood                   2012                   None            

   

 

                    gastroesophageal reflux                   Quality           

        worsening   

                          2012                   None                

 

                    myalgias                   Location                   on the

 right arm          

                          2012                   None                

 

                    myalgias                   Location                   on the

 left arm           

                          2012                   underneath both arms at p

it area            

   

 

                myalgias                   Quality                   aching     

              

2012                              None                

 

                myalgias                   Quality                   constant   

                

2012                              None                

 

                myalgias                   Quality                   burning    

               

2012                              None                

 

                    myalgias                   Quality                   tendern

ess                 

                          2012                   None                

 

                myalgias                   Quality                   warmth     

              

2012                              None                

 

                    myalgias                   Onset and Resolution             

      sudden in 

onset                     2012                   None                

 

                    myalgias                   Onset of Symptom                 

  1 months ago      

                          2012                   None                

 

                    myalgias                   Limitation on Activities         

          moderately

limits activities                   2012                   None           

    

 

                    myalgias                   Frequency of Episodes            

       increasing   

                          2012                   None                

 

                    hypertension                   Blood Pressure Values        

           Stage 

0:-139 mmHg / DBP 85-89 mmHg                   2011                

                                        with bradycardia                

 

                headache                   Quality                   pressure   

                

2011                              None                

 

                    fatigue                   Onset and Resolution              

     ongoing        

                          2011                   None                

 

                    sores                   Location-Extremities                

   on the right 

thigh                     2011                   None                

 

                    rash                   Location-Major                   in t

he groin area       

                          2011                   None                

 

                    rash                   Location-Major                   on t

he chest            

                          2011                   None                



                                                                              



Advance Directives

          No Advance Directive data                                             
                      



Encounters

                      



                    Encounter                   Performer                   Loca

tion                

                          Codes                     Date                

 

                                                            (11936) OFFICE/OUTPA

TIENT VISIT EST

Diagnosis: FLU VACCINE[ICD9: V04.81]                                     

Gabriel SHIPMAN [employer] [facility]            

   

                          CPT-4: 03066                   2013             

   

 

                                                            OFFICE/OUTPATIENT VI

SIT EST

Diagnosis: GERD[ICD9: 530.81]

Diagnosis: LYMPHADENOPATHY[ICD9: 785.6]                                     

Maura SHIPMAN [employer] [facility]                   

CPT-4: 71352                            2012                

 

                                                            OFFICE/OUTPATIENT VI

SIT EST

Diagnosis: SYST LUPUS ERYTHEMATOSUS[ICD9: 710.0]

Diagnosis: MALAISE AND FATIGUE[ICD9: 780.79]

Diagnosis: HYPERTENSION[ICD9: 401.9]

Diagnosis: CEPHALGIA[ICD9: 784.0]                                     Gabriel SHIPMAN [employer]                   CPT-4: 

32337                                   2011                

 

                                                            (59139) OFFICE/OUTPA

TIENT VISIT EST                         

                          Gabriel OCLE [employer] [facility]    

                          CPT-4: 20232                   2011             

   



                                                                                
                                                                             



Plan of Care

                      



                    Planned Activity                   Notes                   C

odes                

                          Status                    Date                

 

                                        Appointment: Gabriel Shipman

WPtel:[phone] [facility]

[place]                                                           INJECTION          

 

                                        2013                

 

                          Patient Education: Patient Medication Summary         

                          

                                        Completed                   2013  

              

 

                          Visit Plan:                    CBC, CMP. Aciphex sampl

es. Discussed that will do

trial of aciphex to see if symptoms improve. Pt. has tried approx. 5 days of 
Prilosec with no improvment.

                                                            2012          

  

   

 

                                        Appointment: Maura Obando

WPtel:[phone]

                                        [address] [place], [place] [id number]                                                           ACUTE ILLNESS      

 

                                        2012                

 

                          Patient Education: Patient Medication Summary         

                          

                                        Completed                   2012  

              

 

                                        Appointment: Gabriel Shipman

WPtel:[phone]

                                        [address] [place], [place] [id number]

[place]                                                           LAB                

 

                                        2011                

 

                          Patient Education: Patient Medication Summary         

                          

                                        Completed                   2011  

              

 

                          Visit Plan:                    Obtain lab results Pt w

ill moniter BP and Pulse 

and return in 2wks for BP check

                                                            2011          

  

   

 

                                        Appointment: Gabriel Shipman

WPtel:[phone]

                                        [address] [place] [address]

[place]                                                           FOLLOW UP          

 

                                        2011                

 

                          Patient Education: Patient Medication Summary         

                          

                                        Completed                   2011  

              

 

                                        Appointment: Gabriel Shipman

WPtel:[phone]

                                        [address] [place] [address]                                                           ACUTE ILLNESS      

 

                                        08/15/2011                

 

                                        Appointment: Maura Obando

WPtel:[phone]

                                        [address] [place], [place] [address]                                                           ACUTE ILLNESS      

 

                                        2011                

 

                          Visit Plan:                    Finish doxycycline Add 

Amoxil Add prednisone 

Notify if worsens

                                                            2011          

  

   

 

                                        Appointment: Gabriel Shipman

WPtel:[phone]

                                        [address] [place], [place] [address]                                                           ER Follow UP       

 

                                        2011                

 

                          Patient Education: Patient Medication Summary         

                          

                                        Completed                   2011  

              



                                                                                
                                                                             



Instructions

                      



                                        Comment                

 

                                                            . CBC, CMP.

Aciphex samples.  Discussed that will do trial of aciphex to see if symptoms 
improve.  Pt. has tried approx. 5 days of Prilosec with no improvment.          
                        

 

                                                            . Finish doxycycline

Add Amoxil

Add prednisone

Notify if worsens                                  

 

                                                            . Obtain lab results

Pt will moniter BP and Pulse and return in 2wks for BP check

## 2020-06-11 NOTE — DISCHARGE INST-SURGICAL
Discharge Inst-Surgical


Depart Medication/Instructions


New, Converted or Re-Newed RX:  RX on Chart





Consults/Follow Up


Patient Instructions:  


As directed


Orders & Referrals





Follow Up Appt:


Return to clinic in 1 week for suture removal or have your partner remove the 

sutures after 1 week.


  Return to clinic when necessary for any signs symptoms and indications of 

infection or bleeding





Call to make follow up appt. for patient in 4 weeks.





Activity: 


Rest for 24 hours, than as tolerated. 





Wound Care:


May remove Band-Aid tomorrow. Replace as desired. Keep incisions clean and dry. 

Wash daily with soap and water.





Please call in RX to patient pharmacy.








Diet: 


As tolerated-Clear Liquids only if nauseated.





Shower or tub bathe as desired.





No driving for 24 hours, no alcoholic beverages for 24 hours, and nothing per 

vagina (no tampons, douching, or intercoarse) for 2 weeks.





Patient to return to the clinic as soon as possible for: Temperature greater 

than 101F, Severe Pain, Foul discharge from incision or vagina, Excessive 

Bleeding (more than a period).





Activity


Activity as Tolerated:  No





Diet


Discharge Diet:  No Restrictions











DARION BENJAMIN MD       Jun 11, 2020 12:54

## 2020-06-11 NOTE — NUR
DR. BENJAMIN NOTIFIED OF PT'S REQUEST FOR NAUSEA MEDICATION. ORDER TO CALL IN ZOFRAN ODT 
8 MG Q4-6 HOURS PRN NAUSEA#20. CALLED TO [facility].

## 2020-06-11 NOTE — NUR
TRANSFERRED TO [facility] ROOM 306 VIA W/C FROM ED WITH DX OF RUPTURED ECTOPIC PREGNANCY ACC BY ED 
STAFF. ASSISTED TO BED. C/O NAUSEA. COOL WASHCLOTH TO FACE. RATES PAIN 6/10.

## 2020-06-11 NOTE — XMS REPORT
Continuity of Care Document

                             Created on: 2020



Suzy Howard

External Reference #: [id number]

: 1980

Sex: Female



Demographics





                          Address                   [address], [place]  [id number]

 

                          Home Phone                [phone] x

 

                          Preferred Language        Unknown

 

                          Marital Status            Unknown

 

                          Nondenominational Affiliation     Unknown

 

                          Race                      Unknown

 

                          Ethnic Group              Unknown





Author





                          Organization              Unknown

 

                          Address                   Unknown

 

                          Phone                     Unavailable



              



Allergies

      



             Active           Description           Code           Type         

  Severity   

                Reaction           Onset           Reported/Identified          

 

Relationship to Patient                 Clinical Status        

 

                Yes             NKANo Known Allergies           NKA             

Miscellaneous 

Allergy           Unknown           N/A                             2007  

      

                                                             



                  



Medications

      



There is no data.                  



Problems

      



             Date Dx Coded           Attending           Type           Code    

       

Diagnosis                               Diagnosed By        

 

           2015                       Ot           710.0                  

           

  

 

           2015                       Ot           710.0                  

           

  

 

           2015                       Ot           710.0                  

           

  



                      



Procedures

      



There is no data.                  



Results

      



There is no data.              



Encounters

      



                ACCT No.           Visit Date/Time           Discharge          

 Status         

             Pt. Type           Provider           Facility           Loc./Unit 

          

Complaint        

 

                0000            06/15/2017 11:07:16           06/15/2017 23:59:5

9           [facility]  

             Outpatient                                                         

  

 

                    [id number]           2014 14:05:00           

014 23:59:59        

           [facility]           Outpatient                                             

         

 

 

             [id number]           2012 12:18:00                         

            

Document Registration                                                           

         

 

             [id number]           2011 10:51:00                         

            

Document Registration                                                           

         

 

             [id number]           2011 16:54:00                         

            

Document Registration

## 2020-06-11 NOTE — NUR
TRANSFERRED TO  [facility] VIA PT BED FROM [facility] BY SAAD MADDEN PACU RN AFTER A LEFT 
PARTIAL OOPHORECTOMY, BILATERAL SALPINGECTOMY, EVACUATION AND A HEMATOMA PERITONEUM BY DR. BENJAMIN. ORIENTED TO SURROUNDINGS. CALL LIGHT PLACED WITHIN REACH. IV PLACED ON PUMP 
WITH NEW TUBING. ASSESSMENT COMPLETED. VSS. SPOUSE AT BEDSIDE. ABD SOFT. LAP SITES X3 D/I. 
ICE PACK TO ABDOMEN. SIDE RAILS UP X4. A/O X4.

## 2020-06-11 NOTE — XMS REPORT
CCD document using C-CDA

                             Created on: 2019



Suzy Howard

External Reference #: [id number]

: 1980

Sex: Female



Demographics





                          Address                   [address], [place]  [id number]

 

                          Home Phone                [phone]

 

                          Preferred Language        English

 

                          Marital Status            Unknown

 

                          Taoist Affiliation     Unknown

 

                          Race                      White

 

                          Ethnic Group              Not  or 





Author





                          Author                    Suzy Sinha

 

                          Organization              [facility]

 

                          Address                   [address] [place]  [address]



 

                          Phone                     [phone]







Care Team Providers





                    Care Team Member Name Role                Phone

 

                          PP                        Unavailable

 

                          CCM                       Unavailable



                                            



Summary Purpose

          Interface Exchange                                                    
               



Insurance Providers

                      



                    Payer name                   Policy type / Coverage type    

               

Covered party ID                   Effective Begin Date                   

Effective End Date                

 

                    [employer]                   [employer]                  

 [id number]          

                          [id number]                   Unknown                



                                                                              



Family history

                      



Mother            



                          Diagnosis                   Age At Onset              

  

 

                          Hypertension                   Unknown                



            



Father            



                          Diagnosis                   Age At Onset              

  

 

                          Hyperlipidemia                   Unknown              

  



                                                                                
       



Social History

                      



                    Social History Element                   Codes              

     Description    

                                        Effective Dates                

 

                    Marital status                   Unknown                   M

arried              

                                        2013                

 

                    Number of children                   Unknown                

   2                

                                        2013                

 

                    Employment                   Unknown                   [employer]

ntly employed       

                                        2013                

 

                    Tobacco history                   SNOMED CT: [id number]      

             Never 

smoker                                  2013                

 

                    Alcohol history                   SNOMED CT: 628457910      

             Never 

drinks alcohol                          2013                

 

                          Has the patient ever used illegal drugs?              

     Unknown              

                          Has never used illegal drugs                   

013                



                                                                                
                                                         



Allergies, Adverse Reactions, Alerts

                      



                Substance                   Reaction                   Codes    

               

Entered Date                   Inactivated Date                   Status        

       

 

                                                            * NO KNOWN DRUG FAM

RGIES                                   

                                    Unknown                   2013        

           No 

Inactive Date                           Active                



                                                                              



Past Medical History

                      



                    Illness                   Codes                   Condition 

Status              

                          Onset Date                   Resolved Date            

    

 

                                                            Generalized anxiety 

disorder                                

                                        ICD-9: 300.02

ICD-10: F41.1                   Active                    2016            

 

                                        Unknown                

 

                                                            Systemic lupus eryth

ematosus, unspecified                   

                                        ICD-9: 710.0

ICD-10: M32.9                   Active                    2016            

 

                                        Unknown                

 

                                                            Encounter for genera

l adult medical examination with 

abnormal findings                                     ICD-9: V70.0

ICD-10: Z00.01                   Active                    2018           

 

                                        Unknown                

 

                                                            Enlarged lymph nodes

, unspecified                           

                                        ICD-9: 785.6

ICD-10: R59.9                   Active                    2015            

 

                                        Unknown                

 

                                                            Gastro-esophageal re

flux disease without esophagitis        

                                        ICD-9: 530.81

ICD-10: K21.9                   Active                    2015            

 

                                        Unknown                

 

                                              DYSURIA                           

          ICD-9: 788.1    

                    Active                   2015                   Unknow

n     

          

 

                                              Fatigue                           

          ICD-9: 780.79   

                    Active                   2015                   Unknow

n    

           

 

                                              Lupus                             

        ICD-9: 710.0      

                    Active                   2015                   Unknow

n       

        

 

                                              Anxiety, generalized              

                       

ICD-9: 300.02                   Active                    2014            

 

                                        Unknown                

 

                                              DEPRESSIVE DISORDER NEC           

                          

ICD-9: 311                   Active                    2014               

 

                                        Unknown                

 

                                              Iron deficiency                   

                  ICD-9: 

280.9                   Active                   2014                   

Unknown                

 

                                              Depression                        

             Unknown      

                    Active                   2014                   Unknow

n       

        

 

                                              Elevated blood pressure           

                          

ICD-9: 796.2                   Active                    2014             

 

                                        Unknown                

 

                                                            Hx of iron deficienc

y anemia                                

                    ICD-9: V12.3                   Active                             

                                        Unknown                



                                                                                
                                                                                
                                                        



Problems

                      



                    Condition                   Codes                   Effectiv

e Dates             

                                        Condition Status                

 

                                                            Generalized anxiety 

disorder                                

                                        ICD-9: 300.02

ICD-10: F41.1                   2016                   Active             

  

 

                                                            Systemic lupus eryth

ematosus, unspecified                   

                                        ICD-9: 710.0

ICD-10: M32.9                   2016                   Active             

  

 

                                                            Encounter for genera

l adult medical examination with 

abnormal findings                                     ICD-9: V70.0

ICD-10: Z00.01                   2018                   Active            

   

 

                                                            Enlarged lymph nodes

, unspecified                           

                                        ICD-9: 785.6

ICD-10: R59.9                   2015                   Active             

  

 

                                                            Gastro-esophageal re

flux disease without esophagitis        

                                        ICD-9: 530.81

ICD-10: K21.9                   2015                   Active             

  

 

                                              DYSURIA                           

          ICD-9: 788.1    

                          2015                   Active                

 

                                              Fatigue                           

          ICD-9: 780.79   

                          2015                   Active                

 

                                              Lupus                             

        ICD-9: 710.0      

                          2015                   Active                

 

                                              Anxiety, generalized              

                       

ICD-9: 300.02                   2014                   Active             

  

 

                                              DEPRESSIVE DISORDER NEC           

                          

ICD-9: 311                   2014                   Active                

 

                                              Iron deficiency                   

                  ICD-9: 

280.9                     2014                   Active                

 

                                              Depression                        

             Unknown      

                          2014                   Active                

 

                                              Elevated blood pressure           

                          

ICD-9: 796.2                   2014                   Active              

 

 

                                                            Hx of iron deficienc

y anemia                                

                    ICD-9: V12.3                   2014                   

Active          

     



                                                                                
                                                                                
                                                        



Medications

                      



                    Medication                   Codes                   Instruc

tions               

                    Start Date                   Stop Date                   Sta

tus              

                                        Fill Instructions                

 

                                              Plaquenil 200 mg tablet           

                          

RxNorm: 274073                   1 Tablet(s) PO daily                   

2018                   10/10/2018                   Inactive              

                                                        

 

                                              Plaquenil 200 mg tablet           

                          

RxNorm: 347534                   1 Tablet(s) PO daily                   

2018                   09/10/2018                   Inactive              

                                                        

 

                                                            Cymbalta 30 mg capsu

le,delayed release                      

                          RxNorm: 880089                   1 CAPSULE(S) PO DAILY

            

                    2016                   In

active        

                                        [SAVINGS FOR NON-COVERED DRUGS -- BIN:

3585, PCN: ASPROD1, Group: 

XXXXX, ID# [id number], Questions: [phone]. THIS IS NOT INSURANCE.]          
     

 

                                                            Augmentin 500 mg-125

 mg tablet                              

                    RxNorm: 797685                   1 Tablet(s) PO TID         

          

2015                   Inactive              

                                                        

 

                                                            Augmentin 500 mg-125

 mg tablet                              

                    RxNorm: 219305                   1 Tablet(s) PO TID         

          

2015                   Inactive              

                                                        

 

                                                            Cymbalta 30 mg capsu

le,delayed release                      

                          RxNorm: 830460                   1 CAPSULE(S) PO DAILY

            

                    2015                   In

active        

                                        [SAVINGS FOR NON-COVERED DRUGS -- BIN:

3585, PCN: [id number], Group: 

XXXXX, ID# [id number], Questions: [phone]. THIS IS NOT INSURANCE.]          
     

 

                                                            Protonix 20 mg table

t,delayed release                       

                          RxNorm: 007566                   1 Tablet(s) PO daily 

             

                    2015                   In

active          

                                                        

 

                                                            Protonix 20 mg table

t,delayed release                       

                          RxNorm: 039119                   1 TABLET(S) PO DAILY 

             

                    2015                   In

active          

                                        **Patient requests 90 days supply**     

           

 

                                                            Bactrim  mg-16

0 mg tablet                             

                    RxNorm: 927365                   1 Tablet(s) PO BID         

          

2015                   Inactive              

                                        [SAVINGS FOR NON-COVERED DRUGS -- BIN:

3585, PCN: [id number], Group: XXXXX, 

ID# [id number],  Questions: [phone].  THIS IS NOT INSURANCE.]               


 

                                                            Bactrim  mg-16

0 mg tablet                             

                    RxNorm: 387041                   1 Tablet(s) PO BID         

          

2015                   Inactive              

                                                        

 

                                                            Cymbalta 30 mg capsu

le,delayed release                      

                          RxNorm: 907585                   1 Capsule(s) PO daily

            

                    2015                   In

active        

                                        [SAVINGS FOR NON-COVERED DRUGS -- BIN:00

3585, PCN: ASPROD1, Group: 

XXXXX, ID# [id number],  Questions: [phone].  THIS IS NOT INSURANCE.]        
       

 

                                                            escitalopram 5 mg ta

blet                                    

                    RxNorm: 649221                   1 Tablet(s) PO QPM         

          2014                   Inactive                       

  

         

 

                                                            escitalopram 5 mg ta

blet                                    

                    RxNorm: 352289                   1 Tablet(s) PO QPM         

          2014                   Inactive                   

[SAVINGS FOR UNINSURED PATIENTS -- BIN:[id number], PCN: ASPROD1, Group: [id number], ID# 
[id number], Process claim through [employer], for questions: [phone]. THIS IS
 NOT INSURANCE.]                

 

                                                            escitalopram 10 mg t

ablet                                   

                    RxNorm: 480718                   1 Tablet(s) PO QHS         

          

2014                   Inactive              

                                                        

 

                                              Plaquenil 200 mg tablet           

                          

RxNorm: 873251                   1 Tablet(s) PO daily                   No Start

Date                   2018                   Inactive                    

              



                                                                                
                                                                                
                                                        



Medication Administered

          No Medication Administered data                                       
                            



Immunizations

                      



                Vaccine                   Codes                   Date          

         Status 

              

 

                    Influenza                   CVX: 141                                  

                                        completed                



                                                                              



Assessments

                      



                    Condition                   Codes                   Effectiv

e Dates             

  

 

                          Generalized anxiety disorder                   ICD-10:

 F41.1

ICD-9: 300.02                           2019                

 

                          Systemic lupus erythematosus, unspecified             

      ICD-10: M32.9

ICD-9: 710.0                            2019                

 

                                        Encounter for general adult medical exam

ination with abnormal findings          

                                        ICD-10: Z00.01

ICD-9: V70.0                            2018                

 

                          Enlarged lymph nodes, unspecified                   IC

D-10: R59.9

ICD-9: 785.6                            2015                

 

                          Gastro-esophageal reflux disease without esophagitis  

                 ICD-10: 

K21.9

ICD-9: 530.81                           2015                

 

                    DYSURIA                   ICD-9: 788.1                                

  

 

                    Fatigue                   ICD-9: 780.79                               

   

 

                    Depressive disorder                   ICD-9: 311            

       2015   

            

 

                    Lupus                   ICD-9: 710.0                                  



 

                    Anxiety, generalized                   ICD-9: 300.02        

           

2014                

 

                    Iron deficiency                   ICD-9: 280.9              

     2014     

          

 

                    Hx of iron deficiency anemia                   ICD-9: V12.3 

                  

2014                

 

                    Elevated blood pressure                   ICD-9: 796.2      

             

2014                



                                                                    



Reason For Visit

                      



                    Reason For Visit                   Effective Dates          

         Notes      

         

 

                    medication follow up                   2018           

                    

   

 

                    medication follow up                   2016           

                    

   

 

                    lymph node enlargement/mass                   2015    

                    

          

 

                    fatigue                   2015                        

           

 

                    depression                   2014                     

              

 

                    depression                   2014                     

              

 

                    fatigue                   2014                        

           



                                                                              



Results

                      



                    Observation                   Observation Code              

     Item           

                    Item Code                   Result                   Date   

             

 

                Lipid                   Ord30                   CHOL            

                

                          130 mg/dL                   2018                

 

                Lipid                   Ord30                   HDL             

                

                          52.0 mg/dl                   2018               

 

 

                Lipid                   Ord30                   TRIG            

                

                          50 mg/dL                   2018                

 

                Lipid                   Ord30                   LDL             

                

                          68 mg/dL                   2018                

 

                Lipid                   Ord30                   C/HDL           

                

                          2.5 Ratio                   2018                

 

                Sed Rate                   Ord21                   ESR          

                

                          11 mm/hr                   2018                

 

                    Cbc With Differential                   Ord2                

   WBC              

                                        5.15 K/ul                   2018  

              

 

                    Cbc With Differential                   Ord2                

   RBC              

                                        4.37 M/ul                   2018  

              

 

                    Cbc With Differential                   Ord2                

   HGB              

                                        12.9 g/dl                   2018  

              

 

                    Cbc With Differential                   Ord2                

   HCT              

                                        39.4 %                   2018     

           

 

                    Cbc With Differential                   Ord2                

   Neut%            

                                        59.1 %                   2018     

           

 

                    Cbc With Differential                   Ord2                

   MCV              

                                        90.2 fl                   2018    

            

 

                    Cbc With Differential                   Ord2                

   Lymph%           

                                        29.7 %                   2018     

           

 

                    Cbc With Differential                   Ord2                

   MCH              

                                        29.5 pg                   2018    

            

 

                    Cbc With Differential                   Ord2                

   Mono%            

                                        9.3 %                   2018      

          

 

                    Cbc With Differential                   Ord2                

   MCHC             

                                        32.7 pg                   2018    

            

 

                    Cbc With Differential                   Ord2                

   Eos%             

                                        1.7 %                   2018      

          

 

                    Cbc With Differential                   Ord2                

   PLT              

                                        354 K/ul                   2018   

             

 

                    Cbc With Differential                   Ord2                

   Baso%            

                                        0.2 %                   2018      

          

 

                    Cbc With Differential                   Ord2                

   RDW              

                                        13.9 %                   2018     

           

 

                    Cbc With Differential                   Ord2                

   Neut ABS#        

                                        3.04 K/ul                   2018  

           

  

 

                    Cbc With Differential                   Ord2                

   Lymph ABS#       

                                        1.53 K/ul                   2018  

          

   

 

                    Cbc With Differential                   Ord2                

   Mono ABS#        

                                        0.5 K/ul                   2018   

           

 

 

                    Cbc With Differential                   Ord2                

   Eos ABS#         

                                        0.1 K/ul                   2018   

            



 

                    Cbc With Differential                   Ord2                

   Baso ABS#        

                                        0.0 K/ul                   2018   

           

 

 

                Comp Metabolic                   Hfb387                   NA    

                

                          138 mEq/L                   2018                

 

                Comp Metabolic                   Hvr782                   K     

                

                          4.4 mEq/L                   2018                

 

                Comp Metabolic                   Lbt459                   CL    

                

                          105 mEq/L                   2018                

 

                Comp Metabolic                   Bbb613                   CO2   

                

                          28.0 mEq/L                   2018               

 

 

                    Comp Metabolic                   Pfm926                   AN

ION GAP             

                                        9                    2018         

       

 

                    Comp Metabolic                   Uuv546                   GL

UCOSE               

                                        90 mg/dL                   2018   

             

 

                    Comp Metabolic                   Lbd286                   Cr

eat                 

                                        0.7 mg/dL                   2018  

              

 

                    Comp Metabolic                   Zmc890                   eG

FR                  

                                        104 ml/min/1.73m2                                  



 

                Comp Metabolic                   Xcn893                   BUN   

                

                          15 mg/dL                   2018                

 

                    Comp Metabolic                   Xoc035                   B/

C Ratio             

                                        22.1 Ratio                   2018 

               

 

                    Comp Metabolic                   Exm653                   CA

LCIUM               

                                        9.4 mg/dL                   2018  

              

 

                    Comp Metabolic                   Rph872                   AL

K PHOS              

                                        47 U/L                   2018     

           

 

                    Comp Metabolic                   Ujj743                   AS

T(SGOT)             

                                        17 U/L                   2018     

           

 

                    Comp Metabolic                   Hjk249                   AL

T(SGPT)             

                                        16 U/L                   2018     

           

 

                    Comp Metabolic                   Kfs660                   BI

LI T                

                                        0.5 mg/dL                   2018  

              

 

                    Comp Metabolic                   Xkq599                   AL

BUMIN               

                                        4.6 g/dL                   2018   

             

 

                    Comp Metabolic                   Zbj367                   TP

RO                  

                                        7.2 g/dL                   2018   

             

 

                    Comp Metabolic                   Pqj846                   GL

OB                  

                                        2.6 g/dL                   2018   

             

 

                    Comp Metabolic                   Ajn397                   A/

G Ratio             

                                        1.8 Ratio                   2018  

              

 

                    Comp Metabolic                   Ank873                   Os

mo                  

                                        276 mOsmo                   2018  

              

 

                Tsh                   Ord6                   TSH (3rd IS)       

                

                          0.61 uIU/mL                   2018              

  

 

                    C-Reactive Protein  Qnt                   Crqnt             

      CRP           

                                        0.4 mg/dl                   2018  

              

 

                    C-Reactive Protein  Qnt                   Crqnt             

      CRP           

                                        0.1 mg/dl                   2015  

              

 

                Mono                   Pmp261                   MONO            

                

                          Negative                    2015                

 

                    Cbc With Differential                   Ord2                

   WBC              

                                        4.9 K/uL                   2015   

             

 

                    Cbc With Differential                   Ord2                

   LYM              

                                        1.5 K/uL                   2015   

             

 

                    Cbc With Differential                   Ord2                

   LYM%             

                                        31.6 %                   2015     

           

 

                    Cbc With Differential                   Ord2                

   NEUT/GRAN        

                                        2.9 K/uL                   2015   

           

 

 

                    Cbc With Differential                   Ord2                

   NEUT/GRAN %      

                                        58.8 %                   2015     

         

 

 

                    Cbc With Differential                   Ord2                

   MID              

                                        0.5 K/uL                   2015   

             

 

                    Cbc With Differential                   Ord2                

   MID%             

                                        9.6 %                   2015      

          

 

                    Cbc With Differential                   Ord2                

   RBC              

                                        4.27 M/uL                   2015  

              

 

                    Cbc With Differential                   Ord2                

   HGB              

                                        12.0 g/dL                   2015  

              

 

                    Cbc With Differential                   Ord2                

   HCT              

                                        38.5 %                   2015     

           

 

                    Cbc With Differential                   Ord2                

   MCV              

                                        90 fL                   2015      

          

 

                    Cbc With Differential                   Ord2                

   MCH              

                                        28 pg                   2015      

          

 

                    Cbc With Differential                   Ord2                

   MCHC             

                                        31 g/dL                   2015    

            

 

                    Cbc With Differential                   Ord2                

   PLT              

                                        349 K/uL                   2015   

             

 

                    Cbc With Differential                   Ord2                

   RDW              

                                        13.9 %                   2015     

           

 

                    Cbc With Differential                   Ord2                

   WBC              

                                        3.4 K/uL                   2015   

             

 

                    Cbc With Differential                   Ord2                

   LYM              

                                        1.4 K/uL                   2015   

             

 

                    Cbc With Differential                   Ord2                

   LYM%             

                                        41.8 %                   2015     

           

 

                    Cbc With Differential                   Ord2                

   NEUT/GRAN        

                                        1.7 K/uL                   2015   

           

 

 

                    Cbc With Differential                   Ord2                

   NEUT/GRAN %      

                                        48.6 %                   2015     

         

 

 

                    Cbc With Differential                   Ord2                

   MID              

                                        0.3 K/uL                   2015   

             

 

                    Cbc With Differential                   Ord2                

   MID%             

                                        9.6 %                   2015      

          

 

                    Cbc With Differential                   Ord2                

   RBC              

                                        4.37 M/uL                   2015  

              

 

                    Cbc With Differential                   Ord2                

   HGB              

                                        13.0 g/dL                   2015  

              

 

                    Cbc With Differential                   Ord2                

   HCT              

                                        38.8 %                   2015     

           

 

                    Cbc With Differential                   Ord2                

   MCV              

                                        89 fL                   2015      

          

 

                    Cbc With Differential                   Ord2                

   MCH              

                                        30 pg                   2015      

          

 

                    Cbc With Differential                   Ord2                

   MCHC             

                                        34 g/dL                   2015    

            

 

                    Cbc With Differential                   Ord2                

   PLT              

                                        315 K/uL                   2015   

             

 

                    Cbc With Differential                   Ord2                

   RDW              

                                        14.8 %                   2015     

           

 

                Comp Metabolic                   Iur068                   NA    

                

                          138 mEq/L                   2015                

 

                Comp Metabolic                   Oxa959                   K     

                

                          4.1 mEq/L                   2015                

 

                Comp Metabolic                   Duw868                   CL    

                

                          101 mEq/L                   2015                

 

                Comp Metabolic                   Prb453                   CO2   

                

                          25.0 mEq/L                   2015               

 

 

                    Comp Metabolic                   Djb027                   AN

ION GAP             

                                        16                    2015        

        

 

                    Comp Metabolic                   Spk357                   GL

UCOSE               

                                        87 mg/dL                   2015   

             

 

                    Comp Metabolic                   Omi461                   Cr

eat                 

                                        0.8 mg/dL                   2015  

              

 

                    Comp Metabolic                   Lqp049                   eG

FR                  

                                        94 ml/min/1.73m2                                   

 

                Comp Metabolic                   Bkv718                   BUN   

                

                          12 mg/dL                   2015                

 

                    Comp Metabolic                   Jaj072                   B/

C Ratio             

                                        16.0 Ratio                   2015 

               

 

                    Comp Metabolic                   Ooy341                   CA

LCIUM               

                                        9.9 mg/dL                   2015  

              

 

                    Comp Metabolic                   Lca947                   AL

K PHOS              

                                        52 U/L                   2015     

           

 

                    Comp Metabolic                   Szc078                   AS

T(SGOT)             

                                        24 U/L                   2015     

           

 

                    Comp Metabolic                   Gio116                   AL

T(SGPT)             

                                        18 U/L                   2015     

           

 

                    Comp Metabolic                   Koe462                   BI

LI T                

                                        0.4 mg/dL                   2015  

              

 

                    Comp Metabolic                   Anc954                   AL

BUMIN               

                                        4.9 g/dL                   2015   

             

 

                    Comp Metabolic                   Hei453                   TP

RO                  

                                        7.6 g/dL                   2015   

             

 

                    Comp Metabolic                   Fii098                   GL

OB                  

                                        2.7 g/dL                   2015   

             

 

                    Comp Metabolic                   Jjf705                   A/

G Ratio             

                                        1.9 Ratio                   2015  

              

 

                    Comp Metabolic                   Exk450                   Os

mo                  

                                        275 mOsmo                   2015  

              

 

                Lipid                   Ord30                   CHOL            

                

                          136 mg/dL                   2015                

 

                Lipid                   Ord30                   HDL             

                

                          54.0 mg/dl                   2015               

 

 

                Lipid                   Ord30                   TRIG            

                

                          46 mg/dL                   2015                

 

                Lipid                   Ord30                   LDL             

                

                          73 mg/dL                   2015                

 

                Lipid                   Ord30                   C/HDL           

                

                          2.5 Ratio                   2015                

 

                Tsh                   Ord6                   hTSH II            

                

                          1.08 uIU/mL                   2015              

  

 

                Sed Rate                   Ord21                   ESR          

                

                          10 mm/hr                   2015                

 

                    C-Reactive Protein  Qnt                   Crqnt             

      CRP           

                                        0.3 mg/dl                   2015  

              

 

                %SAT/TIBC                   [id number]                   TIBC      

                

                          388 UG/DL                   2014                

 

                    %SAT/TIBC                   6358828                   % SATU

RAT                 

                                        21 %                   2014       

         

 

                %SAT/TIBC                   [id number]                   UIBC      

                

                          308 MCG/DL                   2014               

 

 

                    LIPID GRP                                      HDL TE

ST                  

                                        52 MG/DL                   2014   

             

 

                LIPID GRP                                      TRIG      

                

                          37 MG/DL                   2014                

 

                    LIPID GRP                                      TEST L

DL                  

                                        59 MG/DL                   2014   

             

 

                LIPID GRP                                      CHOL      

                

                          118 MG/DL                   2014                

 

                    LIPID GRP                                      RCHOL/

HDL                 

                                        2.27 RATIO                   2014 

               

 

                    IRON TEST                   [id number]                   IRON T

EST                 

                                        80 UG/DL                   2014   

             

 

                CBC                   [id number]                   WBC             

                

                          4.3 10e9/L                   2014               

 

 

                CBC                   [id number]                   RBC             

                

                          4.00 10e12/L                   2014             

   

 

                CBC                   [id number]                   HGB             

                

                          12.0 g/dL                   2014                

 

                CBC                   [id number]                   HCT DET         

                

                          36.2 %                    2014                

 

                CBC                   [id number]                   MCV             

                

                          90.5 fL                   2014                

 

                CBC                   [id number]                   MCH             

                

                          30.0 pg                   2014                

 

                CBC                   [id number]                   MCHC            

                

                          33.1 g/dL                   2014                

 

                CBC                   [id number]                   PLT             

                

                          349 10e9/L                   2014               

 

 

                CBC                   [id number]                   MPV             

                

                          9.8 fL                    2014                

 

                CBC                   [id number]                   [place] %           

                

                          54.6 %                    2014                

 

                CBC                   [id number]                   LY %            

                

                          32.5 %                    2014                

 

                CBC                   [id number]                   MON %           

                

                          11.1 %                    2014                

 

                CBC                   [id number]                   EOS  %          

                

                          1.6 %                     2014                

 

                CBC                   [id number]                   BASO %          

                

                          0.2 %                     2014                

 

                CBC                   [id number]                   RDW             

                

                          14.0 %                    2014                

 

                CBC                   [id number]                   ABS [place]         

                

                          2.35 10e9/L                   2014              

  

 

                CBC                   [id number]                   ABS LYMPH       

                

                          1.40 10e9/L                   2014              

  

 

                CBC                   [id number]                   ABS MONO        

                

                          0.48 10e9/L                   2014              

  

 

                CBC                   [id number]                   ABS EOS         

                

                          0.07 10e9/L                   2014              

  

 

                CBC                   [id number]                   ABS BASO        

                

                          0.01 10e9/L                   2014              

  

 

                CBC                   [id number]                   RDW-SD          

                

                          44.7 fL                   2014                

 

                TSH                   [id number]                   TSH             

                

                          1.277 uIU/ML                   2014             

   

 

                GFR CALC                   [id number]                   GFR AA     

                

                          >60 ML/MIN                   2014               

 

 

                    GFR CALC                   [id number]                   GFR NON

-AA                 

                                        >60 ML/MIN                   2014 

               

 

                CHEM 14                   [id number]                   AST         

                

                          20 U/L                    2014                

 

                CHEM 14                   [id number]                   ALT         

                

                          12 IU/L                   2014                

 

                CHEM 14                   [id number]                   BUN         

                

                          13 MG/DL                   2014                

 

                CHEM 14                   [id number]                   ALBUMIN     

                

                          4.7 GM/DL                   2014                

 

                CHEM 14                   [id number]                   CHLORIDE    

                

                          110 MMOL/L                   2014               

 

 

                CHEM 14                   [id number]                   BILI TOT    

                

                          0.6 MG/DL                   2014                

 

                CHEM 14                   [id number]                   ALK PHOS    

                

                          41 U/L                    2014                

 

                CHEM 14                   [id number]                   SODIUM      

                

                          140 MMOL/L                   2014               

 

 

                    CHEM 14                   [id number]                   CREATINI

NE                  

                                        0.75 MG/DL                   2014 

               

 

                CHEM 14                   [id number]                   CALCIUM     

                

                          9.4 MG/DL                   2014                

 

                CHEM 14                   [id number]                   POTASSIUM   

                

                          4.1 MMOL/L                   2014               

 

 

                CHEM 14                   [id number]                   PROT TOT    

                

                          7.1 GM/DL                   2014                

 

                CHEM 14                   [id number]                   GLUCOSE     

                

                          93 MG/DL                   2014                

 

                CHEM 14                   [id number]                   BICARB      

                

                          24 MMOL/L                   2014                

 

                CHEM 14                   [id number]                   ANION GAP   

                

                          6 MEQ/L                   2014                



                                                                                
                                                                                
                                                                                
                                             



Review of Systems

                      



                    System                   Result                   Effective 

Dates               



 

                    Constitutional                   No recent illness          

         2018 

              

 

                    Constitutional                   No anorexia                

   2018       

        

 

                    Constitutional                   No night sweats            

       2018   

            

 

                    Constitutional                   No chills                  

 2018         

      

 

                    Constitutional                   No diaphoresis             

      2018    

           

 

                    Constitutional                   fatigue                   0

2018           

    

 

                    Constitutional                   No fever                   

2018          

     

 

                    Constitutional                   No insomnia                

   2018       

        

 

                    Constitutional                   No malaise                 

  2018        

       

 

                    Constitutional                   No weight loss             

      2018    

           

 

                    Constitutional                   No weight gain             

      2018    

           

 

                    Constitutional                   No obesity                 

  2018        

       

 

                    Eyes                   No blindness                   2018                

 

                    Eyes                   No eye pain                   

018                

 

                    Eyes                   No vision change                               

   

 

                    Ears/Nose/Throat/Neck                   No dizziness        

           

2018                

 

                    Ears/Nose/Throat/Neck                   No headache         

          2018

               

 

                    Cardiovascular                   No chest pain/pressure     

              

2018                

 

                    Cardiovascular                   No dyspnea                 

  2018        

       

 

                    Respiratory                   No chest congestion           

        2018  

             

 

                    Respiratory                   No chest tightness            

       2018   

            

 

                    Respiratory                   No cough                                

  

 

                    Gastrointestinal                   No abdominal pain        

           

2018                

 

                    Genitourinary/Nephrology                   No anuria/oliguri

a                   

2018                

 

                    Genitourinary/Nephrology                   No dysuria       

            

2018                

 

                    Musculoskeletal                   No stiffness              

     2018     

          

 

                    Dermatologic                   No rash                                

  

 

                    Dermatologic                   No sores                               

   

 

                    Neurologic                   No dizziness                   

2018          

     

 

                    Neurologic                   No headache                   0

2018           

    

 

                    Psychiatric                   No anxiety                   0

2018           

    

 

                          Hematologic/Lymphatic                   No abnormal ec

chymoses                  

                                        2018                

 

                          Hematologic/Lymphatic                   No abnormal bl

eeding and bruising       

                                        2018                

 

                    Gastrointestinal                   No diarrhea              

     2018     

          

 

                    Gastrointestinal                   constipation             

      2018    

           

 

                    Musculoskeletal                   No joint complaint        

           

2018                

 

                    Musculoskeletal                   No back pain              

     2018     

          

 

                    Constitutional                   No recent illness          

         2016 

              

 

                    Constitutional                   No anorexia                

   2016       

        

 

                    Constitutional                   No night sweats            

       2016   

            

 

                    Constitutional                   No chills                  

 2016         

      

 

                    Constitutional                   No diaphoresis             

      2016    

           

 

                    Constitutional                   No fatigue                 

  2016        

       

 

                    Constitutional                   No fever                   

2016          

     

 

                    Constitutional                   No insomnia                

   2016       

        

 

                    Constitutional                   No weight loss             

      2016    

           

 

                    Constitutional                   No malaise                 

  2016        

       

 

                    Constitutional                   No weight gain             

      2016    

           

 

                    Constitutional                   No obesity                 

  2016        

       

 

                    Eyes                   No blindness                   2016                

 

                    Eyes                   No eye pain                   

016                

 

                    Eyes                   No vision change                               

   

 

                    Ears/Nose/Throat/Neck                   No dizziness        

           

2016                

 

                    Ears/Nose/Throat/Neck                   No headache         

          2016

               

 

                    Cardiovascular                   No chest pain/pressure     

              

2016                

 

                    Cardiovascular                   No dyspnea                 

  2016        

       

 

                    Respiratory                   No chest congestion           

        2016  

             

 

                    Respiratory                   No chest tightness            

       2016   

            

 

                    Respiratory                   No cough                                

  

 

                    Gastrointestinal                   No abdominal pain        

           

2016                

 

                    Gastrointestinal                   constipation             

      2016    

           

 

                    Genitourinary/Nephrology                   No anuria/oliguri

a                   

2016                

 

                    Genitourinary/Nephrology                   No dysuria       

            

2016                

 

                    Musculoskeletal                   No stiffness              

     2016     

          

 

                    Musculoskeletal                   back pain                 

  2016        

       

 

                    Dermatologic                   No rash                                

  

 

                    Dermatologic                   No sores                               

   

 

                    Neurologic                   No dizziness                   

2016          

     

 

                    Neurologic                   No headache                   1

2016           

    

 

                    Psychiatric                   No anxiety                   1

2016           

    

 

                          Hematologic/Lymphatic                   No abnormal ec

chymoses                  

                                        2016                

 

                          Hematologic/Lymphatic                   No abnormal bl

eeding and bruising       

                                        2016                

 

                    Constitutional                   No recent illness          

         2015 

              

 

                    Constitutional                   No chills                  

 2015         

      

 

                    Constitutional                   fatigue                   1

2015           

    

 

                    Constitutional                   No fever                   

2015          

     

 

                    Constitutional                   No insomnia                

   2015       

        

 

                    Eyes                   No blindness                   2015                

 

                    Eyes                   No vision change                               

   

 

                    Ears/Nose/Throat/Neck                   No dizziness        

           

2015                

 

                    Ears/Nose/Throat/Neck                   No headache         

          2015

               

 

                    Ears/Nose/Throat/Neck                   No hearing loss     

              

2015                

 

                    Ears/Nose/Throat/Neck                   No nasal allergies  

                 

2015                

 

                    Ears/Nose/Throat/Neck                   No sore throat      

             

2015                

 

                    Ears/Nose/Throat/Neck                   No postnasal drip   

                

2015                

 

                    Ears/Nose/Throat/Neck                   No sinus congestion 

                  

2015                

 

                    Cardiovascular                   No dyspnea                 

  2015        

       

 

                    Respiratory                   No cigarette smoking          

         2015 

              

 

                    Respiratory                   No cough                   2015             

  

 

                    Respiratory                   No dyspnea                   1

2015           

    

 

                    Gastrointestinal                   gastroesophageal reflux  

                 

2015                

 

                    Gastrointestinal                   No nausea                

   2015       

        

 

                    Gastrointestinal                   No vomiting              

     2015     

          

 

                    Musculoskeletal                   No stiffness              

     2015     

          

 

                    Musculoskeletal                   No swelling               

    2015      

         

 

                    Musculoskeletal                   No muscle weakness        

           

2015                

 

                    Musculoskeletal                   No myalgias               

    2015      

         

 

                    Dermatologic                   No rash                   2015             

  

 

                    Dermatologic                   No scar                   2015             

  

 

                    Neurologic                   No dizziness                   

2015          

     

 

                    Neurologic                   No headache                   1

2015           

    

 

                    Neurologic                   No neck pain                   

2015          

     

 

                    Neurologic                   No syncope                               

   

 

                    Psychiatric                   anxiety                   2015              

 

 

                    Psychiatric                   depression                   1

2015           

    

 

                    Ears/Nose/Throat/Neck                   No dental pain      

             

2015                

 

                    Cardiovascular                   No chest pain/pressure     

              

2015                

 

                    Cardiovascular                   No edema                   

2015          

     

 

                    Constitutional                   No recent illness          

         2015 

              

 

                    Constitutional                   No chills                  

 2015         

      

 

                    Constitutional                   fatigue                   0

2015           

    

 

                    Constitutional                   No fever                   

2015          

     

 

                    Constitutional                   No insomnia                

   2015       

        

 

                    Constitutional                   malaise                   0

2015           

    

 

                    Eyes                   No blindness                   2015                

 

                    Eyes                   No vision change                               

   

 

                    Ears/Nose/Throat/Neck                   No dental pain      

             

2015                

 

                    Ears/Nose/Throat/Neck                   No dizziness        

           

2015                

 

                    Ears/Nose/Throat/Neck                   No dysphagia        

           

2015                

 

                    Ears/Nose/Throat/Neck                   No headache         

          2015

               

 

                    Ears/Nose/Throat/Neck                   No hearing loss     

              

2015                

 

                    Ears/Nose/Throat/Neck                   No nasal allergies  

                 

2015                

 

                    Ears/Nose/Throat/Neck                   No sore throat      

             

2015                

 

                    Ears/Nose/Throat/Neck                   No postnasal drip   

                

2015                

 

                    Ears/Nose/Throat/Neck                   No sinus congestion 

                  

2015                

 

                    Cardiovascular                   No chest pain/pressure     

              

2015                

 

                    Cardiovascular                   No dyspnea                 

  2015        

       

 

                    Cardiovascular                   No edema                   

2015          

     

 

                    Cardiovascular                   No exercise intolerance    

               

2015                

 

                    Cardiovascular                   No fatigue                 

  2015        

       

 

                    Cardiovascular                   No near-syncope/dizziness  

                 

2015                

 

                    Respiratory                   No chest tightness            

       2015   

            

 

                    Respiratory                   No cigarette smoking          

         2015 

              

 

                    Respiratory                   No cough                                

  

 

                    Respiratory                   No dyspnea                   0

2015           

    

 

                    Respiratory                   No pedal edema                

   2015       

        

 

                    Respiratory                   No snoring                   0

2015           

    

 

                    Respiratory                   No wheezing                   

2015          

     

 

                    Gastrointestinal                   No hemorrhoids           

        2015  

             

 

                    Gastrointestinal                   No abdominal pain        

           

2015                

 

                    Gastrointestinal                   No constipation          

         2015 

              

 

                    Gastrointestinal                   No diarrhea              

     2015     

          

 

                    Gastrointestinal                   No gastroesophageal reflu

x                   

2015                

 

                    Gastrointestinal                   No melena                

   2015       

        

 

                    Gastrointestinal                   No nausea                

   2015       

        

 

                    Gastrointestinal                   No vomiting              

     2015     

          

 

                    Genitourinary/Nephrology                   No dysuria       

            

2015                

 

                    Genitourinary/Nephrology                   No nocturia      

             

2015                

 

                          Genitourinary/Nephrology                   No urinary 

incontinence              

                                        2015                

 

                    Musculoskeletal                   No stiffness              

     2015     

          

 

                    Musculoskeletal                   No swelling               

    2015      

         

 

                    Musculoskeletal                   No muscle weakness        

           

2015                

 

                    Musculoskeletal                   No myalgias               

    2015      

         

 

                    Dermatologic                   No rash                                

  

 

                    Dermatologic                   No scar                                

  

 

                    Neurologic                   No dizziness                   

2015          

     

 

                    Neurologic                   No headache                   0

2015           

    

 

                    Neurologic                   No neck pain                   

2015          

     

 

                    Neurologic                   No syncope                               

   

 

                    Psychiatric                   anxiety                   /2015              

 

 

                    Psychiatric                   depression                   0

2015           

    

 

                    Constitutional                   No recent illness          

         2014 

              

 

                    Constitutional                   No chills                  

 2014         

      

 

                    Constitutional                   No fatigue                 

  2014        

       

 

                    Constitutional                   No fever                   

2014          

     

 

                    Constitutional                   No insomnia                

   2014       

        

 

                    Constitutional                   No malaise                 

  2014        

       

 

                    Cardiovascular                   No chest pain/pressure     

              

2014                

 

                    Cardiovascular                   No dyspnea                 

  2014        

       

 

                    Cardiovascular                   No edema                   

2014          

     

 

                    Cardiovascular                   No exercise intolerance    

               

2014                

 

                    Cardiovascular                   No fatigue                 

  2014        

       

 

                    Cardiovascular                   No near-syncope/dizziness  

                 

2014                

 

                    Respiratory                   No chest tightness            

       2014   

            

 

                    Respiratory                   No cigarette smoking          

         2014 

              

 

                    Respiratory                   No cough                   2014             

  

 

                    Respiratory                   No dyspnea                   1

2014           

    

 

                    Respiratory                   No pedal edema                

   2014       

        

 

                    Respiratory                   No snoring                   1

2014           

    

 

                    Respiratory                   No wheezing                   

2014          

     

 

                    Psychiatric                   No anxiety                   1

2014           

    

 

                    Psychiatric                   No depression                 

  2014        

       

 

                    Gastrointestinal                   No hemorrhoids           

        2014  

             

 

                    Gastrointestinal                   No abdominal pain        

           

2014                

 

                    Gastrointestinal                   No constipation          

         2014 

              

 

                    Gastrointestinal                   No diarrhea              

     2014     

          

 

                    Gastrointestinal                   No gastroesophageal reflu

x                   

2014                

 

                    Gastrointestinal                   No melena                

   2014       

        

 

                    Gastrointestinal                   No nausea                

   2014       

        

 

                    Gastrointestinal                   No vomiting              

     2014     

          

 

                    Constitutional                   No recent illness          

         2014 

              

 

                    Constitutional                   No chills                  

 2014         

      

 

                    Constitutional                   No fatigue                 

  2014        

       

 

                    Constitutional                   No fever                   

2014          

     

 

                    Constitutional                   No insomnia                

   2014       

        

 

                    Constitutional                   No malaise                 

  2014        

       

 

                    Cardiovascular                   No chest pain/pressure     

              

2014                

 

                    Cardiovascular                   No dyspnea                 

  2014        

       

 

                    Cardiovascular                   No edema                   

2014          

     

 

                    Cardiovascular                   No exercise intolerance    

               

2014                

 

                    Cardiovascular                   No fatigue                 

  2014        

       

 

                    Cardiovascular                   No near-syncope/dizziness  

                 

2014                

 

                    Respiratory                   No chest tightness            

       2014   

            

 

                    Respiratory                   No cigarette smoking          

         2014 

              

 

                    Respiratory                   No cough                   2014             

  

 

                    Respiratory                   No dyspnea                   0

2014           

    

 

                    Respiratory                   No pedal edema                

   2014       

        

 

                    Respiratory                   No snoring                   0

2014           

    

 

                    Respiratory                   No wheezing                   

2014          

     

 

                    Psychiatric                   No anxiety                   0

2014           

    

 

                    Psychiatric                   No depression                 

  2014        

       

 

                    Constitutional                   No recent illness          

         2014 

              

 

                    Constitutional                   No chills                  

 2014         

      

 

                    Constitutional                   No fatigue                 

  2014        

       

 

                    Constitutional                   No fever                   

2014          

     

 

                    Constitutional                   No insomnia                

   2014       

        

 

                    Constitutional                   No malaise                 

  2014        

       

 

                    Cardiovascular                   No chest pain/pressure     

              

2014                

 

                    Cardiovascular                   No dyspnea                 

  2014        

       

 

                    Cardiovascular                   No edema                   

2014          

     

 

                    Cardiovascular                   No exercise intolerance    

               

2014                

 

                    Cardiovascular                   No fatigue                 

  2014        

       

 

                    Cardiovascular                   No near-syncope/dizziness  

                 

2014                

 

                    Respiratory                   No chest tightness            

       2014   

            

 

                    Respiratory                   No cigarette smoking          

         2014 

              

 

                    Respiratory                   No cough                   2014             

  

 

                    Respiratory                   No dyspnea                   0

2014           

    

 

                    Respiratory                   No pedal edema                

   2014       

        

 

                    Respiratory                   No snoring                   0

2014           

    

 

                    Respiratory                   No wheezing                   

2014          

     

 

                    Psychiatric                   anxiety                                 

 

 

                    Psychiatric                   depression                   0

2014           

    

 

                    Neurologic                   No dizziness                   

2014          

     

 

                    Neurologic                   No headache                   0

2014           

    

 

                    Neurologic                   No neck pain                   

2014          

     

 

                    Neurologic                   No syncope                               

   

 

                    Dermatologic                   No rash                   2014             

  

 

                    Dermatologic                   No scar                   2014             

  

 

                    Musculoskeletal                   No stiffness              

     2014     

          

 

                    Musculoskeletal                   No swelling               

    2014      

         

 

                    Musculoskeletal                   No muscle weakness        

           

2014                

 

                    Musculoskeletal                   No myalgias               

    2014      

         

 

                    Gastrointestinal                   No hemorrhoids           

        2014  

             

 

                    Gastrointestinal                   No abdominal pain        

           

2014                

 

                    Gastrointestinal                   No constipation          

         2014 

              

 

                    Gastrointestinal                   No diarrhea              

     2014     

          

 

                    Gastrointestinal                   No gastroesophageal reflu

x                   

2014                

 

                    Gastrointestinal                   No melena                

   2014       

        

 

                    Gastrointestinal                   No nausea                

   2014       

        

 

                    Gastrointestinal                   No vomiting              

     2014     

          

 

                    Eyes                   No blindness                   2014                

 

                    Eyes                   No vision change                               

   

 

                    Ears/Nose/Throat/Neck                   No dental pain      

             

2014                

 

                    Ears/Nose/Throat/Neck                   No dizziness        

           

2014                

 

                    Ears/Nose/Throat/Neck                   No dysphagia        

           

2014                

 

                    Ears/Nose/Throat/Neck                   No headache         

          2014

               

 

                    Ears/Nose/Throat/Neck                   No hearing loss     

              

2014                

 

                    Ears/Nose/Throat/Neck                   No nasal allergies  

                 

2014                

 

                    Ears/Nose/Throat/Neck                   No sore throat      

             

2014                

 

                    Ears/Nose/Throat/Neck                   No postnasal drip   

                

2014                

 

                    Ears/Nose/Throat/Neck                   No sinus congestion 

                  

2014                

 

                    Genitourinary/Nephrology                   No dysuria       

            

2014                

 

                    Genitourinary/Nephrology                   No nocturia      

             

2014                

 

                          Genitourinary/Nephrology                   No urinary 

incontinence              

                                        2014                



                                                                    



Physical Exam

                      



                    Exam Name                   System Name                   It

em Name             

                    Status                   Result                   Effective 

Dates          

                                        Notes                

 

                    Full Exam - General                    Constitutional   

                 

general appearance                   Development:                   well 

developed                   2018                   None                

 

                    Full Exam - General                    Constitutional   

                 

general appearance                   Development:                   appears 

stated age                   2018                   None                

 

                    Full Exam - General                    Constitutional   

                 

general appearance                   Overall:                   well developed  

                          2018                   None                

 

                    Full Exam - General                    Constitutional   

                 

general appearance                   Overall:                   in no acute 

distress                   2018                   None                

 

                    Full Exam - General                    Constitutional   

                 

general appearance                   Overall:                   well nourished  

                          2018                   None                

 

                    Full Exam - General                    Constitutional   

                 

general appearance                      Hygiene/Attention to Grooming:          

   

                    good hygiene                   2018                   

None           

    

 

                    Full Exam - General                    Eyes             

      

conjunctiva/eyelids                   Overall:                   conjunctiva 

clear                     2018                   None                

 

                    Full Exam - General                    Eyes             

      

conjunctiva/eyelids                   Overall:                   cornea clear   

                          2018                   None                

 

                    Full Exam - General                    Eyes             

      

conjunctiva/eyelids                   Overall:                   eyelids normal 

                          2018                   None                

 

                    Full Exam - General                    Eyes             

      pupils and 

irises                    Overall:                   pupils equal, round, 

reactive to light and accomodation                   2018                 

                                        None                

 

                    Full Exam - General                    Ears/Nose/Throat 

                  

otoscopic exam                   Overall:                   external auditory 

canals clear                   2018                   None                

 

                    Full Exam - General                    Ears/Nose/Throat 

                  

otoscopic exam                   Overall:                   tympanic membranes 

clear                     2018                   None                

 

                    Full Exam - General                    Ears/Nose/Throat 

                  

lips/teeth/gingiva                   Overall:                   benign lips     

                          2018                   None                

 

                    Full Exam - General                    Ears/Nose/Throat 

                  

lips/teeth/gingiva                   Overall:                   normal dentition

                          2018                   None                

 

                    Full Exam - General                    Ears/Nose/Throat 

                  

oral cavity/pharynx/larynx                    Overall:                   oral 

mucosa clear                   2018                   None                

 

                    Full Exam - General                    Ears/Nose/Throat 

                  

oral cavity/pharynx/larynx                    Overall:                   

oropharyngeal mucosa clear                   2018                   None  

             

 

                    Full Exam - General                    Ears/Nose/Throat 

                  

oral cavity/pharynx/larynx                    Overall:                   

hypopharynx benign                   2018                   None          

     

 

                    Full Exam - General                    Ears/Nose/Throat 

                  

oral cavity/pharynx/larynx                    Overall:                   no 

masses                    2018                   None                

 

                    Full Exam - General                    Respiratory      

             

auscultation                   Overall:                   breath sounds clear 

bilaterally                   2018                   None                

 

                    Full Exam - General                    Respiratory      

             

respiratory effort/rhythm                   Overall:                   no 

retractions                   2018                   None                

 

                    Full Exam - General                    Respiratory      

             

respiratory effort/rhythm                   Overall:                   normal 

rate                      2018                   None                

 

                    Full Exam - General                    Cardiovascular   

                

extremities                   Overall:                   no clubbing            

                          2018                   None                

 

                    Full Exam - General                    Cardiovascular   

                

auscultation of heart                   Overall:                   regular rate 

                          2018                   None                

 

                    Full Exam - General                    Cardiovascular   

                

auscultation of heart                   Overall:                   normal heart 

sounds                    2018                   None                

 

                    Full Exam - General                    Cardiovascular   

                

auscultation of heart                   Overall:                   no murmurs   

                          2018                   None                

 

                    Full Exam - General                    Abdomen          

         abdominal 

exam                      Overall:                   no tenderness              

   

                          2018                   None                

 

                    Full Exam - General                    Abdomen          

         abdominal 

exam                      Overall:                   normal bowel sounds        

   

                          2018                   None                

 

                    Full Exam - General                    Integument       

            

inspection of skin                   Overall:                   few scattered 

moles, no gross abnormalities                   2018                   

None                

 

                    Full Exam - General                    Neurologic       

            cranial 

nerves                    Overall:                   crainial nerves 2 - 12 

grossly intact                   2018                   None              

 

 

                    Full Exam - General                    Psychiatric      

             

orientation/consciousness                   Overall:                   oriented 

to person, place and time                   2018                   None   

            

 

                    Full Exam - General                    Psychiatric      

             mood 

and affect                   Overall:                   normal mood and affect  

                          2018                   None                

 

                    Full Exam - General                    Psychiatric      

             mood 

and affect                   Mood:                   happy                   

2018                              None                

 

                    Full Exam - General                    Constitutional   

                 

general appearance                   Development:                   well 

developed                   2016                   None                

 

                    Full Exam - General                    Constitutional   

                 

general appearance                   Development:                   appears 

stated age                   2016                   None                

 

                    Full Exam - General                    Constitutional   

                 

general appearance                   Overall:                   well developed  

                          2016                   None                

 

                    Full Exam - General                    Constitutional   

                 

general appearance                   Overall:                   in no acute 

distress                   2016                   None                

 

                    Full Exam - General                    Constitutional   

                 

general appearance                   Overall:                   well nourished  

                          2016                   None                

 

                    Full Exam - General                    Constitutional   

                 

general appearance                      Hygiene/Attention to Grooming:          

   

                    good hygiene                   2016                   

None           

    

 

                    Full Exam - General                    Eyes             

      

conjunctiva/eyelids                   Overall:                   conjunctiva 

clear                     2016                   None                

 

                    Full Exam - General                    Eyes             

      

conjunctiva/eyelids                   Overall:                   cornea clear   

                          2016                   None                

 

                    Full Exam - General                    Eyes             

      

conjunctiva/eyelids                   Overall:                   eyelids normal 

                          2016                   None                

 

                    Full Exam - General                    Eyes             

      pupils and 

irises                    Overall:                   pupils equal, round, 

reactive to light and accomodation                   2016                 

                                        None                

 

                    Full Exam - General                    Ears/Nose/Throat 

                  

otoscopic exam                   Overall:                   external auditory 

canals clear                   2016                   None                

 

                    Full Exam - General                    Ears/Nose/Throat 

                  

otoscopic exam                   Overall:                   tympanic membranes 

clear                     2016                   None                

 

                    Full Exam - General                    Ears/Nose/Throat 

                  

lips/teeth/gingiva                   Overall:                   benign lips     

                          2016                   None                

 

                    Full Exam - General                    Ears/Nose/Throat 

                  

lips/teeth/gingiva                   Overall:                   normal dentition

                          2016                   None                

 

                    Full Exam - General                    Ears/Nose/Throat 

                  

oral cavity/pharynx/larynx                    Overall:                   oral 

mucosa clear                   2016                   None                

 

                    Full Exam - General                    Ears/Nose/Throat 

                  

oral cavity/pharynx/larynx                    Overall:                   

oropharyngeal mucosa clear                   2016                   None  

             

 

                    Full Exam - General                    Ears/Nose/Throat 

                  

oral cavity/pharynx/larynx                    Overall:                   

hypopharynx benign                   2016                   None          

     

 

                    Full Exam - General                    Ears/Nose/Throat 

                  

oral cavity/pharynx/larynx                    Overall:                   no 

masses                    2016                   None                

 

                    Full Exam - General                    Respiratory      

             

auscultation                   Overall:                   breath sounds clear 

bilaterally                   2016                   None                

 

                    Full Exam - General                    Respiratory      

             

respiratory effort/rhythm                   Overall:                   no 

retractions                   2016                   None                

 

                    Full Exam - General                    Respiratory      

             

respiratory effort/rhythm                   Overall:                   normal 

rate                      2016                   None                

 

                    Full Exam - General                    Cardiovascular   

                

extremities                   Overall:                   no clubbing            

                          2016                   None                

 

                    Full Exam - General                    Cardiovascular   

                

auscultation of heart                   Overall:                   regular rate 

                          2016                   None                

 

                    Full Exam - General                    Cardiovascular   

                

auscultation of heart                   Overall:                   normal heart 

sounds                    2016                   None                

 

                    Full Exam - General                    Cardiovascular   

                

auscultation of heart                   Overall:                   no murmurs   

                          2016                   None                

 

                    Full Exam - General                    Neurologic       

            cranial 

nerves                    Overall:                   crainial nerves 2 - 12 

grossly intact                   2016                   None              

 

 

                    Full Exam - General                    Psychiatric      

             

orientation/consciousness                   Overall:                   oriented 

to person, place and time                   2016                   None   

            

 

                    Full Exam - General                    Psychiatric      

             mood 

and affect                   Overall:                   normal mood and affect  

                          2016                   None                

 

                    Full Exam - General                    Psychiatric      

             mood 

and affect                   Mood:                   happy                   

2016                              None                

 

                    Full Exam - General                    Abdomen          

         abdominal 

exam                      Overall:                   no tenderness              

   

                          2016                   None                

 

                    Full Exam - General                    Abdomen          

         abdominal 

exam                      Overall:                   normal bowel sounds        

   

                          2016                   None                

 

                    Full Exam - General                    Integument       

            

inspection of skin                   Overall:                   few scattered 

moles, no gross abnormalities                   2016                   

None                

 

                    Full Exam - General                    Constitutional   

                 

general appearance                   Development:                   well 

developed                   2015                   None                

 

                    Full Exam - General                    Constitutional   

                 

general appearance                   Development:                   appears 

stated age                   2015                   None                

 

                    Full Exam - General                    Constitutional   

                 

general appearance                   Overall:                   well developed  

                          2015                   None                

 

                    Full Exam - General                    Constitutional   

                 

general appearance                   Overall:                   in no acute 

distress                   2015                   None                

 

                    Full Exam - General                    Constitutional   

                 

general appearance                   Overall:                   well nourished  

                          2015                   None                

 

                    Full Exam - General                    Constitutional   

                 

general appearance                      Hygiene/Attention to Grooming:          

   

                    good hygiene                   2015                   

None           

    

 

                    Full Exam - General                    Eyes             

      

conjunctiva/eyelids                   Overall:                   conjunctiva 

clear                     2015                   None                

 

                    Full Exam - General                    Eyes             

      

conjunctiva/eyelids                   Overall:                   cornea clear   

                          2015                   None                

 

                    Full Exam - General                    Eyes             

      

conjunctiva/eyelids                   Overall:                   eyelids normal 

                          2015                   None                

 

                    Full Exam - General                    Eyes             

      pupils and 

irises                    Overall:                   pupils equal, round, 

reactive to light and accomodation                   2015                 

                                        None                

 

                    Full Exam - General                    Ears/Nose/Throat 

                  

otoscopic exam                   Overall:                   external auditory 

canals clear                   2015                   None                

 

                    Full Exam - General                    Ears/Nose/Throat 

                  

otoscopic exam                   Overall:                   tympanic membranes 

clear                     2015                   None                

 

                    Full Exam - General                    Ears/Nose/Throat 

                  

lips/teeth/gingiva                   Overall:                   benign lips     

                          2015                   None                

 

                    Full Exam - General                    Ears/Nose/Throat 

                  

lips/teeth/gingiva                   Overall:                   normal dentition

                          2015                   None                

 

                    Full Exam - General                    Ears/Nose/Throat 

                  

oral cavity/pharynx/larynx                    Overall:                   oral 

mucosa clear                   2015                   None                

 

                    Full Exam - General                    Ears/Nose/Throat 

                  

oral cavity/pharynx/larynx                    Overall:                   

oropharyngeal mucosa clear                   2015                   None  

             

 

                    Full Exam - General                    Ears/Nose/Throat 

                  

oral cavity/pharynx/larynx                    Overall:                   

hypopharynx benign                   2015                   None          

     

 

                    Full Exam - General                    Ears/Nose/Throat 

                  

oral cavity/pharynx/larynx                    Overall:                   no 

masses                    2015                   None                

 

                    Full Exam - General                    Respiratory      

             

auscultation                   Overall:                   breath sounds clear 

bilaterally                   2015                   None                

 

                    Full Exam - General                    Respiratory      

             

respiratory effort/rhythm                   Overall:                   no 

retractions                   2015                   None                

 

                    Full Exam - General                    Respiratory      

             

respiratory effort/rhythm                   Overall:                   normal 

rate                      2015                   None                

 

                    Full Exam - General                    Cardiovascular   

                

extremities                   Overall:                   no clubbing            

                          2015                   None                

 

                    Full Exam - General                    Cardiovascular   

                

auscultation of heart                   Overall:                   regular rate 

                          2015                   None                

 

                    Full Exam - General                    Cardiovascular   

                

auscultation of heart                   Overall:                   normal heart 

sounds                    2015                   None                

 

                    Full Exam - General                    Cardiovascular   

                

auscultation of heart                   Overall:                   no murmurs   

                          2015                   None                

 

                    Full Exam - General                    Neurologic       

            cranial 

nerves                    Overall:                   crainial nerves 2 - 12 

grossly intact                   2015                   None              

 

 

                    Full Exam - General                    Psychiatric      

             

orientation/consciousness                   Overall:                   oriented 

to person, place and time                   2015                   None   

            

 

                    Full Exam - General                    Psychiatric      

             mood 

and affect                   Overall:                   normal mood and affect  

                          2015                   None                

 

                    Full Exam - General                    Lymphatic        

           neck 

nodes                     Anterior cervical chain:                   shotty     

  

                          2015                   None                

 

                    Full Exam - General                    Lymphatic        

           neck 

nodes                     Posterior cervical chain:                   soft      

  

                          2015                   None                

 

                    Full Exam - General                    Lymphatic        

           neck 

nodes                     Posterior cervical chain:                   mobile    

  

                          2015                   None                

 

                    Full Exam - General                    Lymphatic        

           neck 

nodes                     Posterior cervical chain:                   tender    

  

                          2015                   None                

 

                    Full Exam - General                    Lymphatic        

           neck 

nodes                     Posterior cervical chain:                   Left size 

(cm): 1                   2015                   None                

 

                    Full Exam - General                    Constitutional   

                 

general appearance                   Development:                   well 

developed                   2015                   None                

 

                    Full Exam - General                    Constitutional   

                 

general appearance                   Development:                   appears 

stated age                   2015                   None                

 

                    Full Exam - General                    Constitutional   

                 

general appearance                   Overall:                   well developed  

                          2015                   None                

 

                    Full Exam - General                    Constitutional   

                 

general appearance                   Overall:                   in no acute 

distress                   2015                   None                

 

                    Full Exam - General                    Constitutional   

                 

general appearance                   Overall:                   well nourished  

                          2015                   None                

 

                    Full Exam - General                    Constitutional   

                 

general appearance                      Hygiene/Attention to Grooming:          

   

                    good hygiene                   2015                   

None           

    

 

                    Full Exam - General                    Eyes             

      

conjunctiva/eyelids                   Overall:                   conjunctiva 

clear                     2015                   None                

 

                    Full Exam - General                    Eyes             

      

conjunctiva/eyelids                   Overall:                   cornea clear   

                          2015                   None                

 

                    Full Exam - General                    Eyes             

      

conjunctiva/eyelids                   Overall:                   eyelids normal 

                          2015                   None                

 

                    Full Exam - General                    Eyes             

      pupils and 

irises                    Overall:                   pupils equal, round, 

reactive to light and accomodation                   2015                 

                                        None                

 

                    Full Exam - General                    Ears/Nose/Throat 

                  

otoscopic exam                   Overall:                   external auditory 

canals clear                   2015                   None                

 

                    Full Exam - General                    Ears/Nose/Throat 

                  

otoscopic exam                   Overall:                   tympanic membranes 

clear                     2015                   None                

 

                    Full Exam - General                    Ears/Nose/Throat 

                  

lips/teeth/gingiva                   Overall:                   benign lips     

                          2015                   None                

 

                    Full Exam - General                    Ears/Nose/Throat 

                  

lips/teeth/gingiva                   Overall:                   normal dentition

                          2015                   None                

 

                    Full Exam - General                    Ears/Nose/Throat 

                  

oral cavity/pharynx/larynx                    Overall:                   oral 

mucosa clear                   2015                   None                

 

                    Full Exam - General                    Ears/Nose/Throat 

                  

oral cavity/pharynx/larynx                    Overall:                   

oropharyngeal mucosa clear                   2015                   None  

             

 

                    Full Exam - General                    Ears/Nose/Throat 

                  

oral cavity/pharynx/larynx                    Overall:                   

hypopharynx benign                   2015                   None          

     

 

                    Full Exam - General                    Ears/Nose/Throat 

                  

oral cavity/pharynx/larynx                    Overall:                   no 

masses                    2015                   None                

 

                    Full Exam - General                    Respiratory      

             

auscultation                   Overall:                   breath sounds clear 

bilaterally                   2015                   None                

 

                    Full Exam - General                    Respiratory      

             

respiratory effort/rhythm                   Overall:                   no 

retractions                   2015                   None                

 

                    Full Exam - General                    Respiratory      

             

respiratory effort/rhythm                   Overall:                   normal 

rate                      2015                   None                

 

                    Full Exam - General                    Cardiovascular   

                

extremities                   Overall:                   no clubbing            

                          2015                   None                

 

                    Full Exam - General                    Cardiovascular   

                

auscultation of heart                   Overall:                   regular rate 

                          2015                   None                

 

                    Full Exam - General                    Cardiovascular   

                

auscultation of heart                   Overall:                   normal heart 

sounds                    2015                   None                

 

                    Full Exam - General                    Cardiovascular   

                

auscultation of heart                   Overall:                   no murmurs   

                          2015                   None                

 

                    Full Exam - General                    Neurologic       

            cranial 

nerves                    Overall:                   crainial nerves 2 - 12 

grossly intact                   2015                   None              

 

 

                    Full Exam - General                    Psychiatric      

             

orientation/consciousness                   Overall:                   oriented 

to person, place and time                   2015                   None   

            

 

                    Full Exam - General                    Psychiatric      

             mood 

and affect                   Overall:                   normal mood and affect  

                          2015                   None                

 

                    Full Exam - General                    Psychiatric      

             mood 

and affect                   Mood:                   happy                   

2015                              None                

 

                    Full Exam - General                    Constitutional   

                 

general appearance                   Overall:                   well developed  

                          2014                   None                

 

                    Full Exam - General                    Constitutional   

                 

general appearance                   Overall:                   in no acute 

distress                   2014                   None                

 

                    Full Exam - General                    Constitutional   

                 

general appearance                   Overall:                   well nourished  

                          2014                   None                

 

                    Full Exam - General                    Respiratory      

             

auscultation                   Overall:                   breath sounds clear 

bilaterally                   2014                   None                

 

                    Full Exam - General                    Respiratory      

             

respiratory effort/rhythm                   Overall:                   no 

retractions                   2014                   None                

 

                    Full Exam - General                    Respiratory      

             

respiratory effort/rhythm                   Overall:                   normal 

rate                      2014                   None                

 

                    Full Exam - General                    Cardiovascular   

                

extremities                   Overall:                   no clubbing            

                          2014                   None                

 

                    Full Exam - General                    Cardiovascular   

                

auscultation of heart                   Overall:                   regular rate 

                          2014                   None                

 

                    Full Exam - General                    Cardiovascular   

                

auscultation of heart                   Overall:                   normal heart 

sounds                    2014                   None                

 

                    Full Exam - General                    Cardiovascular   

                

auscultation of heart                   Overall:                   no murmurs   

                          2014                   None                

 

                    Full Exam - General                    Psychiatric      

             

orientation/consciousness                   Overall:                   oriented 

to person, place and time                   2014                   None   

            

 

                    Full Exam - General                    Psychiatric      

             mood 

and affect                   Overall:                   normal mood and affect  

                          2014                   None                

 

                    Full Exam - General                    Psychiatric      

             mood 

and affect                   Mood:                   happy                   

2014                              None                

 

                    Full Exam - General                    Constitutional   

                 

general appearance                   Overall:                   well nourished  

                          2014                   None                

 

                    Full Exam - General                    Constitutional   

                 

general appearance                   Overall:                   well developed  

                          2014                   None                

 

                    Full Exam - General                    Constitutional   

                 

general appearance                   Overall:                   in no acute 

distress                   2014                   None                

 

                    Full Exam - General                    Psychiatric      

             mood 

and affect                   Mood:                   happy                   

2014                              None                

 

                    Full Exam - General                    Psychiatric      

             mood 

and affect                   Overall:                   normal mood and affect  

                          2014                   None                

 

                    Full Exam - General                    Psychiatric      

             

orientation/consciousness                   Overall:                   oriented 

to person, place and time                   2014                   None   

            

 

                    Full Exam - General                    Cardiovascular   

                

auscultation of heart                   Overall:                   regular rate 

                          2014                   None                

 

                    Full Exam - General                    Cardiovascular   

                

auscultation of heart                   Overall:                   normal heart 

sounds                    2014                   None                

 

                    Full Exam - General                    Cardiovascular   

                

auscultation of heart                   Overall:                   no murmurs   

                          2014                   None                

 

                    Full Exam - General                    Cardiovascular   

                

extremities                   Overall:                   no clubbing            

                          2014                   None                

 

                    Full Exam - General                    Respiratory      

             

respiratory effort/rhythm                   Overall:                   normal 

rate                      2014                   None                

 

                    Full Exam - General                    Respiratory      

             

respiratory effort/rhythm                   Overall:                   no 

retractions                   2014                   None                

 

                    Full Exam - General                    Respiratory      

             

auscultation                   Overall:                   breath sounds clear 

bilaterally                   2014                   None                

 

                    Full Exam - General                    Constitutional   

                 

general appearance                   Overall:                   well developed  

                          2014                   None                

 

                    Full Exam - General                    Constitutional   

                 

general appearance                   Overall:                   in no acute 

distress                   2014                   None                

 

                    Full Exam - General                    Constitutional   

                 

general appearance                   Overall:                   well nourished  

                          2014                   None                

 

                    Full Exam - General                    Respiratory      

             

auscultation                   Overall:                   breath sounds clear 

bilaterally                   2014                   None                

 

                    Full Exam - General                    Respiratory      

             

respiratory effort/rhythm                   Overall:                   no 

retractions                   2014                   None                

 

                    Full Exam - General                    Respiratory      

             

respiratory effort/rhythm                   Overall:                   normal 

rate                      2014                   None                

 

                    Full Exam - General                    Cardiovascular   

                

extremities                   Overall:                   no clubbing            

                          2014                   None                

 

                    Full Exam - General                    Cardiovascular   

                

auscultation of heart                   Overall:                   regular rate 

                          2014                   None                

 

                    Full Exam - General                    Cardiovascular   

                

auscultation of heart                   Overall:                   normal heart 

sounds                    2014                   None                

 

                    Full Exam - General                    Cardiovascular   

                

auscultation of heart                   Overall:                   no murmurs   

                          2014                   None                

 

                    Full Exam - General                    Psychiatric      

             

orientation/consciousness                   Overall:                   oriented 

to person, place and time                   2014                   None   

            

 

                    Full Exam - General                    Psychiatric      

             mood 

and affect                   Overall:                   normal mood and affect  

                          2014                   None                

 

                    Full Exam - General                    Psychiatric      

             mood 

and affect                   Mood:                   happy                   

2014                              None                

 

                    Full Exam - General                    Constitutional   

                 

general appearance                   Development:                   appears 

stated age                   2014                   None                

 

                    Full Exam - General                    Constitutional   

                 

general appearance                   Development:                   well 

developed                   2014                   None                

 

                    Full Exam - General                    Constitutional   

                 

general appearance                      Hygiene/Attention to Grooming:          

   

                    good hygiene                   2014                   

None           

    

 

                    Full Exam - General                    Eyes             

      

conjunctiva/eyelids                   Overall:                   conjunctiva 

clear                     2014                   None                

 

                    Full Exam - General                    Eyes             

      

conjunctiva/eyelids                   Overall:                   cornea clear   

                          2014                   None                

 

                    Full Exam - General                    Eyes             

      

conjunctiva/eyelids                   Overall:                   eyelids normal 

                          2014                   None                

 

                    Full Exam - General                    Eyes             

      pupils and 

irises                    Overall:                   pupils equal, round, 

reactive to light and accomodation                   2014                 

                                        None                

 

                    Full Exam - General                    Ears/Nose/Throat 

                  

otoscopic exam                   Overall:                   external auditory 

canals clear                   2014                   None                

 

                    Full Exam - General                    Ears/Nose/Throat 

                  

otoscopic exam                   Overall:                   tympanic membranes 

clear                     2014                   None                

 

                    Full Exam - General                    Ears/Nose/Throat 

                  

lips/teeth/gingiva                   Overall:                   benign lips     

                          2014                   None                

 

                    Full Exam - General                    Ears/Nose/Throat 

                  

lips/teeth/gingiva                   Overall:                   normal dentition

                          2014                   None                

 

                    Full Exam - General                    Ears/Nose/Throat 

                  

oral cavity/pharynx/larynx                    Overall:                   

hypopharynx benign                   2014                   None          

     

 

                    Full Exam - General                    Ears/Nose/Throat 

                  

oral cavity/pharynx/larynx                    Overall:                   no 

masses                    2014                   None                

 

                    Full Exam - General                    Ears/Nose/Throat 

                  

oral cavity/pharynx/larynx                    Overall:                   oral 

mucosa clear                   2014                   None                

 

                    Full Exam - General                    Ears/Nose/Throat 

                  

oral cavity/pharynx/larynx                    Overall:                   

oropharyngeal mucosa clear                   2014                   None  

             

 

                    Full Exam - General                    Abdomen          

         abdominal 

exam                      Overall:                   no tenderness              

   

                          2014                   None                

 

                    Full Exam - General                    Abdomen          

         abdominal 

exam                      Overall:                   normal bowel sounds        

   

                          2014                   None                

 

                    Full Exam - General                    Integument       

            

inspection of skin                   Overall:                   few scattered 

moles, no gross abnormalities                   2014                   

None                

 

                    Full Exam - General                    Neurologic       

            deep 

tendon reflexes                   Overall:                   deep tendon 

reflexes intact                   2014                   None             

  

 

                    Full Exam - General                    Neurologic       

            cranial 

nerves                    Overall:                   crainial nerves 2 - 12 

grossly intact                   2014                   None              

 



                                                                              



Procedures

                      



                    Procedure                   Codes                   Date    

            

 

                                                            URINALYSIS NONAUTO W

/O SCOPE                                

                          CPT-4: 00811                   2015             

   

 

                                                            IRON TEST (ASSAY OF 

IRON)                                   

                          CPT-4: 76409                   2014             

   

 

                                                            TSH (ASSAY THYROID S

REKHA HORMONE)                            

                          CPT-4: 22549                   2014             

   

 

                                                            CBC (COMPLETE CBC W/

AUTO DIFF WBC)                          

                          CPT-4: 74982                   2014             

   

 

                                                            CHEM 14 (COMPREHEN M

ETABOLIC PANEL)                         

                          CPT-4: 74949                   2014             

   

 

                                              LIPID GRP (LIPID PANEL)           

                          

CPT-4: 08042                            2014                

 

                                              ROUTINE VENIPUNCTURE              

                       

CPT-4: 80106                            2014                

 

                                              %sat/tibc                         

            CPT-4: 05623  

                                        2014                



                                                                                
                                                                   



Vital Signs

                      



                          Date                      Vital                

 

                          2018                                       Blood

 Pressure 1: 112/68 Code: 

8480-6                                                         BMI: 28.3 Code: 

77080-8                                                         Heart Rate 1: 72

bpm                                                         Height: 5'5"        

                                                            SpO2: 99%           

            

                                                            Weight: 170 lbs     

                           

  

 

                          2016                                       Blood

 Pressure 1: 110/72 Code: 

8480-6                                                         Heart Rate 1: 76 

bpm                                                         Height: 5'5"        

                                                            SpO2: 96%           

            

          

 

                          2015                                       Blood

 Pressure 1: 140/92 Code: 

8480-6                                                         BMI: 27.8 Code: 

19027-5                                                         Heart Rate 1: 87

bpm                                                         Height: 5'5"        

                                                            SpO2: 98%           

            

                                                            Weight: 167 lbs     

                           

  

 

                          2015                                       Blood

 Pressure 1: 116/78 Code: 

8480-6                                                         BMI: 27.1 Code: 

64470-9                                                         Heart Rate 1: 60

bpm                                                         Height: 5'5"        

                                                            Weight: 163 lbs     

            

                 

 

                          2014                                       Blood

 Pressure 1: 128/82 Code: 

8480-6                                                         BMI: 27.5 Code: 

25403-2                                                         Heart Rate 1: 60

bpm                                                         Height: 5'5"        

                                                            Weight: 165 lbs     

            

                 

 

                          2014                                       Blood

 Pressure 1: 114/76 Code: 

8480-6                                                         BMI: 26.6 Code: 

32695-0                                                         Height: 5'5"    

                                                            Weight: 160 lbs     

        

                     

 

                          2014                                       Blood

 Pressure 1: 130/90 Code: 

8480-6                                                         BMI: 27.0 Code: 

90795-7                                                         Heart Rate 1: 52

bpm                                                         Height: 5'5"        

                                                            Weight: 162 lbs     

            

                 



                                                                                
                                                                   



Functional Status

          No Functional Status data                                             
            



History of Present Illness

                      



                    Symptom Name                   Status                   Resu

lt                  

                          Effective Date                   Notes                

 

                    medication follow up                   Additional Comments  

                 

medication use                   2018                   None              

 

 

                    medication follow up                   Location             

      oral intake   

                          2018                   None                

 

                    medication follow up                   Quality              

     chronic        

                          2018                   None                

 

                    medication follow up                   Location             

      oral intake   

                          2016                   None                

 

                    depression                   Quality                   impro

ving                

                          2016                   None                

 

                    depression                   Onset and Resolution           

        resolved    

                          2016                   None                

 

                    depression                   Onset of Symptom               

    during adulthood

                          2016                   None                

 

                    depression                   Limitation on Activities       

            does not

limit activities                   2016                   None            

   

 

                    depression                   Frequency of Episodes          

         decreasing 

                          2016                   None                

 

                    depression                   Significant Medical Conditions 

                  

anxiety disorder                   2016                   None            

   

 

                    depression                   Triggers                   stre

ss                  

                          2016                   None                

 

                    depression                   Pertinent Findings             

      Denies 

irritability                   2016                   None                

 

                    depression                   Pertinent Findings             

      Denies 

lethargy                   2016                   None                

 

                    lymph node enlargement/mass                   Quality       

            soft    

                          2015                   None                

 

                    lymph node enlargement/mass                   Quality       

            firm    

                          2015                   None                

 

                          lymph node enlargement/mass                   Onset an

d Resolution              

                    gradual in onset                   2015               

    None        

       

 

                          lymph node enlargement/mass                   Onset of

 Symptom                  

                    1 months ago                   2015                   

None                

 

                          lymph node enlargement/mass                   Frequenc

y of Episodes             

                    daily                   2015                   None   

             

 

                    constipation                   Quality                   1-2

 stools per week    

                          2015                   None                

 

                    constipation                   Onset and Resolution         

          ongoing   

                          2015                   None                

 

                    fatigue                   Onset and Resolution              

     ongoing        

                          2015                   Denies that she is depres

sed  not taking 

Lexapro makes her more tired.                

 

                    fatigue                   Onset of Symptom                  

 _ months ago       

                          2015                   increasing over last coup

le of months, 

gets home from work and naps and is in bed by 8                

 

                    fatigue                   Pertinent Findings                

   cold intolerance 

                          2015                   cold all of the time     

         

 

 

                    joint complaint                   Location                  

 on both knees      

                          2015                   None                

 

                    joint complaint                   Location                  

 on both shoulders  

                          2015                   fingers- feels achy like 

she has 

the flu                

 

                    joint complaint                   Onset of Symptom          

         2 months 

ago                       2015                   intermittent sometimes st

deon

are difficult- she has been running                

 

                    joint complaint                   Pertinent Findings        

           Denies 

joint redness                   2015                   None               



 

                    depression                   Quality                   chron

ic                  

                          2014                   None                

 

                    depression                   Onset and Resolution           

        ongoing     

                          2014                   None                

 

                    depression                   Triggers                   stre

ss                  

                          2014                   None                

 

                    depression                   Alleviating Factors            

       medication   

                          2014                   None                

 

                    fatigue                   Onset and Resolution              

     ongoing        

                          2014                   None                

 

                    fatigue                   Onset of Symptom                  

 1 years ago        

                          2014                   None                

 

                    fatigue                   Limitation on Activities          

         moderately 

limits activities                   2014                   None           

    

 

                    depression                   Quality                   inter

mittent             

                          2014                   thinks depression is bett

er.                

 

                    depression                   Onset of Symptom               

    1 years ago     

                          2014                   None                

 

                    depression                   Pertinent Findings             

      Denies anxiety

                          2014                   None                

 

                    depression                   Pertinent Findings             

      Denies 

depressed mood                   2014                   mind is racing.   

            

 

                    depression                   Pertinent Findings             

      lethargy      

                          2014                   still tired, but she is m

ore tired on 

the lexapro.                

 

                    depression                   Pertinent Findings             

      poor self 

esteem                    2014                   None                

 

                    depression                   Pertinent Findings             

      Denies 

suicidal ideation                   2014                   None           

    

 

                    depression                   Pertinent Findings             

      Denies suicide

attempt/plan                   2014                   None                

 

                    depression                   Pertinent Findings             

      Denies self-

harm                      2014                   None                

 

                    depression                   Quality                   chron

ic                  

                          2014                   None                

 

                    depression                   Onset and Resolution           

        ongoing     

                          2014                   STATES LEXAPRO IS HELPING

             

  

 

                    depression                   Triggers                   stre

ss                  

                          2014                   None                

 

                    depression                   Alleviating Factors            

       medication   

                          2014                   None                

 

                    fatigue                   Onset of Symptom                  

 1 years ago        

                          2014                   None                

 

                    fatigue                   Onset and Resolution              

     ongoing        

                          2014                   None                

 

                    fatigue                   Limitation on Activities          

         moderately 

limits activities                   2014                   None           

    

 

                    depression                   Onset of Symptom               

    1 years ago     

                          2014                   None                

 

                    depression                   Pertinent Findings             

      anxiety       

                          2014                   None                

 

                    depression                   Pertinent Findings             

      depressed mood

                          2014                   None                

 

                    depression                   Pertinent Findings             

      lethargy      

                          2014                   None                

 

                    depression                   Pertinent Findings             

      poor self 

esteem                    2014                   None                

 

                    depression                   Pertinent Findings             

      Denies suicide

attempt/plan                   2014                   None                

 

                    depression                   Pertinent Findings             

      Denies 

suicidal ideation                   2014                   None           

    

 

                    depression                   Pertinent Findings             

      Denies self-

harm                      2014                   None                

 

                    depression                   Onset and Resolution           

        ongoing     

                          2014                   None                

 

                    depression                   Triggers                   stre

ss                  

                          2014                   None                

 

                    depression                   Quality                   inter

mittent             

                          2014                   None                



                                                                    



Advance Directives

          No Advance Directive data                                             
                      



Encounters

                      



                    Encounter                   Performer                   Loca

tion                

                          Codes                     Date                

 

                                                            (93556) PREV VISIT E

ST AGE 18-39

Diagnosis: Encounter for general adult medical examination with abnormal 
findings[ICD10: Z00.01]

Diagnosis: Systemic lupus erythematosus, unspecified[ICD10: M32.9]              
                          Elba Sinha MD, 

[facility] 

                          CPT-4: 99996                   2018             

   

 

                                                            ([id number]) 74435 EST. P

ATIENT, LEVEL III

Diagnosis: Generalized anxiety disorder[ICD10: F41.1]

Diagnosis: Systemic lupus erythematosus, unspecified[ICD10: M32.9]              
                          Elba Sinha MD, 

[facility] 

                          CPT-4: 33171                   2016             

   

 

                                                            84932 EST. PATIENT, 

LEVEL III

Diagnosis: Enlarged lymph nodes, unspecified[ICD10: R59.9]

Diagnosis: Gastro-esophageal reflux disease without esophagitis[ICD10: K21.9]   
                                 Pat Sinha MD, [facility]                   CPT-4: 53214                   2015             

  

 

                                                            ([id number]) 31340 EST. P

ATIENT, LEVEL I

Diagnosis: DYSURIA[ICD9: 788.1]                                     Vicky Sinha MD, [facility]                   CPT-4: 05520

                                        2015                

 

                                                            ([id number]) 62932 EST. P

ATIENT, LEVEL III

Diagnosis: Lupus[ICD9: 710.0]

Diagnosis: Fatigue[ICD9: 780.79]

Diagnosis: Depressive disorder[ICD9: 311]                                     

Vicky Sinha MD, [facility]                   CPT-4:

69830                                   2015                

 

                                                            ([id number]) 07531 EST. P

ATIENT, LEVEL II

Diagnosis: Anxiety, generalized[ICD9: 300.02]

Diagnosis: DEPRESSIVE DISORDER NEC[ICD9: 311]                                   
                    Vicky Sinha MD, [facility]     

              CPT-

4: 58887                                2014                

 

                                                            ([id number]) 93017 EST. P

ATIENT, LEVEL III

Diagnosis: Iron deficiency[ICD9: 280.9]

Diagnosis: DEPRESSIVE DISORDER NEC[ICD9: 311]                                   
                    Vicky Sinha MD, [facility]     

              CPT-

4: 83117                                2014                

 

                                                            (08993) OFFICE  VISI

T, [facility] - LEVEL 3

Diagnosis: Anxiety, generalized[ICD9: 300.02]

Diagnosis: Depressive disorder[ICD9: 311]

Diagnosis: Elevated blood pressure[ICD9: 796.2]

Diagnosis: Lupus[ICD9: 710.0]

Diagnosis: Fatigue[ICD9: 780.79]

Diagnosis: Hx of iron deficiency anemia[ICD9: V12.3]                            
                          Vicky Sinha MD, [facility]                 

                          CPT-4: 55892                   2014             

   



                                                                                
                                                                                
                                    



Plan of Care

                      



                    Planned Activity                   Notes                   C

odes                

                          Status                    Date                

 

                          Patient Education: Patient Medication Summary         

                          

                                        Completed                   2019  

              

 

                    Care Plan: Comp Metabolic                                   

                     

                          Pending                   2019                

 

                    Care Plan: Cbc With Differential                            

                     

                          Pending                   2019                

 

                Care Plan: Tsh                                                  

        Pending 

                                        2019                

 

                    Care Plan: C-Reactive Protein  Qnt                          

                     

                          Pending                   2019                

 

                Care Plan: Sed Rate                                             

             

Pending                                 2019                

 

                          Visit Plan:                    Well Adult - pt was cou

nseled about diet, 

exercise, and encouraged to follow a heart healthy diet and increase activity 
level. The patient was instructed to RTC yearly for well adult exams and PRN for
acute illnesses. The pt was also instructed to have yearly labs for check of 
cholesterol, thyroid, chem panel, CBC, and renal functioning. Lupus-not 
currently taking any medications-check labs

                                                            2018          

  

   

 

                                        Appointment: Elba Cardenas 

WPtel:[phone]

                                        [address] [place], [place] [address]

                                                           (30 min) Complex   

 

                                        2018                

 

                          Patient Education: Patient Medication Summary         

                          

                                        Completed                   2018  

              

 

                          Visit Plan:                    Lupus-symptoms controll

ed-check labs Anxiety-

patient off cymbalta-symptoms fairly well controlled-call if symptoms become 
uncontrolled

                                                            2016          

  

   

 

                                        Appointment: Elba Cardenas 

WPtel:[phone]

                                        [facility]

                                                           (30 min) Complex   

 

                                        2016                

 

                          Patient Education: Patient Medication Summary         

                          

                                        Completed                   2016  

              

 

                          Visit Plan:                    Esophageal Reflux - the

 patient has been 

counseled against excessive intake of caffeine, spicy foods, peppermint, and 
cinnamon - all of which can exacerbate esophageal reflux. The patient is to take
medications as prescribed and call the office if the symptoms are not improving.
Possible Swollen lymph node in the left posterior cervical chain. Check CBC, 
CMP, ESR, CRP.

                                                            2015          

  

   

 

                Appointment:                                                    

       (15 min) 

Moderate                                2015                

 

                          Patient Education: Patient Medication Summary         

                          

                                        Completed                   2015  

              

 

                          Visit Plan:                    UTI - pt with positive 

urinalysis - culture sent 

if appropriate. Antibiotic electronically prescribed to pt's pharmacy of choice.
Pt to call if symptoms do not improve.

                                                            2015          

  

   

 

                Appointment:                                                    

       Lab Draw 

                                        2015                

 

                          Patient Education: Patient Medication Summary         

                          

                                        Completed                   2015  

              

 

                          Visit Plan:                    Lupus - chronic - pt do

es not appear to be having

a flair at this time. Pt to call if symptoms worsen. Anxiety and Depression - 
uncontrolled - Pt has been counseled about the diagnosis of anxiety and de
pression, the potential causes, and risks associated with the diagnosis. The pt 
denies suicidal ideation, or plans. The patient has been counseled about 
treatment options, and understands the risks associated with treatment of 
depression, as well as the risks associated with NOT treating the depression. I 
believe the pt will benefit from medical intervention and an antidepressant has 
been appropriately prescribed for this patient.

                                                            2015          

  

   

 

                                        Appointment: Vicky Sinha 

WPtel:[phone]

                                        [address] [facility]

[place]                                                           Sick               

 

                                        2015                

 

                                        Appointment: Vicky Sinha 

WPtel:[phone]

                                        [facility] [facility]

                                                           Follow up          

 

                                        2015                

 

                          Patient Education: Patient Medication Summary         

                          

                                        Completed                   2015  

              

 

                          Visit Plan:                    Chronic Depression and 

anxiety - the pt has 

symptoms of chronic anxiety and depression that have been fairly well controlled
since the last office visit. The pt has expected periods of exacerbation with 
abatement of the symptoms with change in situational exposure. No change in 
current medications.

                                                            2014          

  

   

 

                                        Appointment: Vicky Sinha 

WPtel:[phone]

                                        [facility]

                                                           Follow up          

 

                                        2014                

 

                          Patient Education: Patient Medication Summary         

                          

                                        Completed                   2014  

              

 

                          Visit Plan:                    Mild iron deficiency - 

advised pt to increase 

iron in her diet. Depression and anxiety - improved - continue with lexapro.

                                                            2014          

  

   

 

                                        Appointment: Vicky Sinha 

WPtel:[phone]

                                        [address] [place], [place] [id number]

                                                           Follow up          

 

                                        2014                

 

                          Patient Education: Patient Medication Summary         

                          

                                        Completed                   2014  

              

 

                          Visit Plan:                    Elevated Blood Pressure

 - without diagnosis of 

hypertension - pt has been instructed to check blood pressure as an outpatient, 
record blood pressure and heart rate and report to the clinic in two weeks on 
the findings. Pt advised to cut back on added salt in the diet. Depression - 
uncontrolled - Pt has been counseled about the diagnosis of depression, the 
potential causes, and risks associated with the diagnosis. The pt denies 
suicidal ideation, or plans. The patient has been counseled about treatment 
options, and understands the risks associated with treatment of depression, as 
well as the risks associated with NOT treating the depression. I believe the pt 
will benefit from medical intervention and an antidepressant has been 
appropriately prescribed for this patient. Lupus - pt has history of lupus - 
currently symptoms controlled, except for elevated blood pressure, will have pt 
check blood pressures at home, monitor symptoms, and we will follow up closely. 
Elevated Blood Pressure - without diagnosis of hypertension - pt has been 
instructed to check blood pressure as an outpatient, record blood pressure and 
heart rate and report to the clinic in two weeks on the findings. Pt advised to 
cut back on added salt in the diet.

                                                            2014          

  

   

 

                                        Appointment: Vicky Sinha 

WPtel:[phone]

                                        [address] [facility]

                                                           New Patient        

 

                                        2014                

 

                          Patient Education: Patient Medication Summary         

                          

                                        Completed                   2014  

              



                                                                                
                                                                                
                                                                                
                                                       



Instructions

                      



                                        Comment                

 

                                                            . Mild iron deficien

cy - advised pt to increase iron in her 

diet.



Depression and anxiety - improved - continue with lexapro.

                                  

 

                                                            . Elevated Blood  Pr

essure - without diagnosis of 

hypertension - pt has been instructed to check blood pressure as an outpatient, 
record blood pressure and heart rate and report to the clinic in two weeks on 
the findings.  Pt advised to cut back on added salt in the diet.



Depression - uncontrolled - Pt has been counseled about the diagnosis of 
depression, the potential causes, and risks associated with the diagnosis.  The 
pt denies suicidal ideation, or plans.  The patient has been counseled about 
treatment options, and understands the risks associated with treatment of 
depression, as well as the risks associated with NOT treating the depression.

I believe the pt will benefit from medical intervention and an antidepressant 
has been appropriately prescribed for this patient.



Lupus - pt has history of lupus - currently symptoms controlled, except for 
elevated blood pressure, will have pt check blood pressures at home, monitor 
symptoms, and we will follow up closely.



Elevated Blood  Pressure - without diagnosis of hypertension - pt has been 
instructed to check blood pressure as an outpatient, record blood pressure and 
heart rate and report to the clinic in two weeks on the findings.  Pt advised to
cut back on added salt in the diet.

                                  

 

                                                            . Esophageal Reflux 

- the patient has been counseled against

excessive intake of caffeine, spicy foods, peppermint, and cinnamon - all of 
which can exacerbate esophageal reflux.

The patient is to take medications as prescribed and call the office if the 
symptoms are not improving.



Possible Swollen lymph node in the left posterior cervical chain.  Check CBC, 
CMP, ESR, CRP.

                                  

 

                                                            . Well Adult - pt wa

s counseled about diet, exercise, and 

encouraged to follow a heart healthy diet and increase activity level.  The 
patient was instructed to RTC yearly for well adult exams and PRN for acute 
illnesses.  The pt was also instructed to have yearly labs for check of 
cholesterol, thyroid, chem panel, CBC, and renal functioning.



Lupus-not currently taking any medications-check labs 

                                  

 

                                                            . Lupus - chronic - 

pt does not appear to be having a flair 

at this time.  Pt to call if symptoms worsen.



Anxiety and Depression - uncontrolled - Pt has been counseled about the 
diagnosis of anxiety and depression, the potential causes, and risks associated 
with the diagnosis.  The pt denies suicidal ideation, or plans.  The patient has
been counseled about treatment options, and understands the risks associated 
with treatment of depression, as well as the risks associated with NOT treating 
the depression.

I believe the pt will benefit from medical intervention and an antidepressant 
has been appropriately prescribed for this patient.

                                  

 

                                                            . UTI - pt with posi

tive urinalysis - culture sent if 

appropriate.  Antibiotic electronically prescribed to pt's pharmacy of choice.  
Pt to call if symptoms do not improve.

                                  

 

                                                            . 

Chronic Depression and anxiety - the pt has symptoms of chronic anxiety and 
depression that have been fairly well controlled since the last office visit.  
The pt has expected periods of exacerbation with abatement of the symptoms with 
change in situational exposure.  No change in current medications.

                                  

 

                                                            Lab work next week.



Increase water intake while taking Thrive.



Recommend miralax for constipation.





                                        . Lupus-symptoms controlled-check labs 



Anxiety-patient off cymbalta-symptoms fairly well controlled-call if symptoms 
become uncontrolled

## 2020-06-11 NOTE — DIAGNOSTIC IMAGING REPORT
INDICATION: Severe pelvic pain. Patient has positive pregnancy

test.



The uterus is anteverted measuring 9.2 x 5.6 x 6.1 cm.

Endometrium is approximately 15 mm in thickness. No intrauterine

gestational sac is identified. No myometrial mass is detected.

Right ovary measures 3.9 x 2.5 x 2.4 cm. Right ovary contains a

small follicle. There is blood flow to the right ovary. Left

ovary measures 4.7 x 2.9 x 4.9 cm. There is a cystic lesion

within the left ovary. A definite fetal pole was not seen. There

is moderate amount of free fluid within the pelvis. In

particular, there appears to be some complex fluid in the left

adnexa which may represent hemorrhage. A ruptured ectopic

pregnancy cannot be excluded. No other abnormalities are

detected.



IMPRESSION: No evidence of intrauterine gestational sac. There is

a cystic lesion in the left ovary with moderate amount of complex

free fluid in the left adnexa suspicious for hemorrhage. In light

of patient's positive pregnancy test, ruptured ectopic cannot be

entirely excluded.



Dictated by: 



  Dictated on workstation # [id number]

## 2020-06-11 NOTE — OPERATIVE REPORT
DATE OF SERVICE:  06/11/2020



PREOPERATIVE DIAGNOSES:

Acute abdomen/suspected ruptured ectopic.



POSTOPERATIVE DIAGNOSES:

Acute abdomen/suspected ruptured ectopic with pathology pending.



OPERATIVE PROCEDURES:

Laparoscopic bilateral salpingectomy, laparoscopic partial left oophorectomy,

laparoscopic evacuation of hemoperitoneum.



OPERATIVE DESCRIPTION:

With the patient in the supine position under satisfactory general anesthesia,

she was repositioned in dorsal lithotomy position in the [facility] and

prepped and draped in the usual fashion for abdominal and vaginal surgery.



Weighted speculum placed in posterior fornix of vagina.  The cervix was grasped

with a single tooth tenaculum and then the uterus was sounded to 11.5 cm with

uterine sound.  The cervix was then serially dilated with Manoj dilators to

accommodate a uterine manipulator was just placed, and the bulb filled with 4 mL

of air.  The tenaculum and speculum were removed and the patient brought in low

dorsal lithotomy position.



A 5 mm incision made in the patient's left upper quadrant.  Veress needle was

placed through that incision into the abdominal cavity.  Correct placement

confirmed with water drop test and the abdomen insufflated with 2.4 liters of

carbon dioxide.  The Veress needle was removed and a 5 mm Optiview laparoscopic

port was placed.  The abdominal wall was transilluminated and ports of 12 mm and

5 mm were placed infraumbilically and suprapubically respectively.  All port

sites were infiltrated with 0.25% Marcaine with epinephrine prior to incision.



The patient was placed in Trendelenburg.  It was immediately apparent on the

initial placement of the left upper quadrant port that there was extensive

hemoperitoneum.  There was a large formed somewhat complex clot in the pelvis

filling the pelvis and obscuring view of the uterus.  The bulk of this clot and

blood was evacuated out.  There was some fresh blood apparently bright red

blood.  Apparently coming from the left ovary.  The right fallopian tube

contained some blood and fluid and appeared to maybe be involved with

endometriosis, but was otherwise normal.  The left fallopian tube looked the

same.  There was a small area that may have been bleeding on the fallopian tube,

but there was an area of the ovary that was ruptured and actively bleeding as

well.  This could be a corpus luteal cyst could be an ectopic pregnancy on the

ovary as well.  Approximately one-third of the antimesenteric portion of the

ovary was removed to remove the abnormal appearing area.  Both fallopian tubes

were removed to ensure removal of the ectopic pregnancy as both tubes appeared

to have blood and from the fimbria.  There was no definite obvious ectopic

pregnancy identified.  The patient had requested that the fallopian tubes be

removed rather than trying to conserve the fallopian tube that she did not want

additional risk or persistent risk for ectopic pregnancies.  Both fallopian

tubes and the portion of left ovary that were removed were sent to pathology

separately.  The pelvis was irrigated and examined for hemostasis.  There was

some bleeding on the staple line on the left.  This was touched with

electrocautery to affect hemostasis.  The ovary was oozing from _____ partial

resection site.  This was touched with electrocautery to affect complete

hemostasis.  Now with irrigation and aspiration, it was clear there was no

bleeding.  There was no abnormal appearing pathology.  The procedure at this

point was terminated.  An effort had been made to see the appendix, but it may

have been retrocecal, but it was not readily apparent from my port sites, so

that was left in situ and not disturb any further.



The operative instruments were removed as were the ports.  The abdomen was

evacuated of the insufflating gas in the process of removing the ports.  The

skin incisions were closed with nylon sutures.  The fascia at the umbilical

incision was closed with figure-of-eight suture of 2-0 Vicryl.



The uterine manipulator bulb was drained out.  Instruments were removed from the

uterus and from the vagina.  Speculum was replaced in the vagina and the cervix

examined for hemostasis, which was complete.



Sponge and needle counts were correct.  Estimated blood loss was a little over

200 mL.  The patient tolerated the procedure well and was uneventfully awakened

from general anesthesia and transferred to the recovery room in stable condition

with plans for discharge home PAR.



The patient was given strict return to clinic precautions for any signs,

symptoms or indications of persistent or recurrent bleeding.  Plan is for

followup in 4 days in my clinic.





Job ID: [id number]

DocumentID: [id number]

Dictated Date:  06/11/2020 16:53:09

Transcription Date: 06/11/2020 22:26:24

Dictated By: DARION BENJAMIN MD

## 2020-06-11 NOTE — ED ABDOMINAL PAIN
General


Chief Complaint:  Abdominal/GI Problems


Stated Complaint:  POSS KIDNEY STONE


Nursing Triage Note:  


Pt ambulates to RM 7 with c/o suprapubic pain that radiates to left rib area. Pt




states pain was worse this morning when she woke up, got a little better when 


she started moving around. Pt also reports some nausea, no vomiting. Pt denies 


any painful/burning urination. Pt denies any fever/chills.


Sepsis Screen:  No Definite Risk


Source of Information:  Patient


Exam Limitations:  No Limitations





History of Present Illness


Date Seen by Provider:  2020


Time Seen by Provider:  06:00


Initial Comments


Shannen presents to the emergency room with abrupt onset of left lower quadrant 

pain that started this morning.  She has mild nausea associated with it.  Pain 

has eased some since arrival.  She declines pain medicine.  She denies any 

vomiting, diarrhea, fever, or urinary symptoms.  She does have chronic problems 

with abdominal discomfort and cramping which is unchanged today.  LMP was May 12

and she does not suspect pregnancy.





Allergies and Home Medications


Allergies


Coded Allergies:  


     NKANo Known Allergies (Verified  Allergy, Unknown, 07)





Home Medications


Ibuprofen 800 Mg Tablet, 800 MG PO Q6H PRN for PAIN


   Prescribed by: DARION VAZ on 20 1309


Oxycodone HCl/Acetaminophen 1 Each Tablet, 1 TAB PO Q4H


   Prescribed by: DARION VAZ on 20 1309





Patient Home Medication List


Home Medication List Reviewed:  Yes





Review of Systems


Review of Systems


Constitutional:  no symptoms reported


EENTM:  No Symptoms Reported


Respiratory:  No Symptoms Reported


Cardiovascular:  No Symptoms Reported


Gastrointestinal:  See HPI


Genitourinary:  See HPI


Musculoskeletal:  no symptoms reported


Skin:  no symptoms reported


Psychiatric/Neurological:  No Symptoms Reported


Endocrine:  No Symptoms Reported


Hematologic/Lymphatic:  No Symptoms Reported





Past Medical-Social-Family Hx


Past Med/Social Hx:  Reviewed Nursing Past Med/Soc Hx


Patient Social History


Recent Foreign Travel:  No


Contact w/Someone Who Travel:  No


Recent Infectious Disease Expo:  No


Recent Hopitalizations:  No


Physical Abuse:  No


Sexual Abuse:  No


Mistreated:  No


Fear:  No





Past Medical History


Surgeries:  Yes (wisdom teeth)


Respiratory:  No


Cardiac:  No


Neurological:  No


Reproductive Disorders:  No


Genitourinary:  No


Gastrointestinal:  Yes (chronic abdominal pain)


Musculoskeletal:  No


Endocrine:  Yes


Lupus


HEENT:  No


Cancer:  No


Psychosocial:  No


Integumentary:  No


Blood Disorders:  No





Physical Exam


Vital Signs





Vital Signs - First Documented








 20





 05:57


 


Temp 36.7


 


Pulse 82


 


Resp 18


 


B/P (MAP) 141/90 (107)


 


Pulse Ox 99


 


O2 Delivery Room Air





Capillary Refill : Less Than 3 Seconds


Height/Weight/BMI


Height: '"


Weight: lbs. oz. kg; 31.00 BMI


Method:


General Appearance:  WD/WN, no apparent distress


HEENT:  normal ENT inspection


Neck:  normal inspection


Respiratory:  lungs clear, normal breath sounds, no respiratory distress


Cardiovascular:  regular rate, rhythm, no edema, no murmur


Gastrointestinal:  normal bowel sounds, soft; No distended; tenderness 

(generalized and most prominent in the left lower quadrant)


Extremities:  normal inspection, no pedal edema


Neurologic/Psychiatric:  CNs II-XII nml as tested, no motor/sensory deficits, 

alert, normal mood/affect, oriented x 3


Skin:  normal color, warm/dry





Progress/Results/Core Measures


Results/Orders


Lab Results





Laboratory Tests








Test


 20


06:04 Range/Units


 


 


White Blood Count


 6.1 


 4.3-11.0


10^3/uL


 


Red Blood Count


 4.34 L


 4.35-5.85


10^6/uL


 


Hemoglobin 13.1  11.5-16.0  G/DL


 


Hematocrit 38  35-52  %


 


Mean Corpuscular Volume 88  80-99  FL


 


Mean Corpuscular Hemoglobin 30  25-34  PG


 


Mean Corpuscular Hemoglobin


Concent 34 


 32-36  G/DL





 


Red Cell Distribution Width 14.0  10.0-14.5  %


 


Platelet Count


 392 


 130-400


10^3/uL


 


Mean Platelet Volume 9.4  7.4-10.4  FL


 


Neutrophils (%) (Auto) 53  42-75  %


 


Lymphocytes (%) (Auto) 33  12-44  %


 


Monocytes (%) (Auto) 12  0-12  %


 


Eosinophils (%) (Auto) 2  0-10  %


 


Basophils (%) (Auto) 0  0-10  %


 


Neutrophils # (Auto) 3.3  1.8-7.8  X 10^3


 


Lymphocytes # (Auto) 2.0  1.0-4.0  X 10^3


 


Monocytes # (Auto) 0.7  0.0-1.0  X 10^3


 


Eosinophils # (Auto)


 0.1 


 0.0-0.3


10^3/uL


 


Basophils # (Auto)


 0.0 


 0.0-0.1


10^3/uL


 


Urine Color YELLOW   


 


Urine Clarity CLOUDY   


 


Urine pH 6.0  5-9  


 


Urine Specific Gravity >=1.030  1.016-1.022  


 


Urine Protein NEGATIVE  NEGATIVE  


 


Urine Glucose (UA) NEGATIVE  NEGATIVE  


 


Urine Ketones NEGATIVE  NEGATIVE  


 


Urine Nitrite NEGATIVE  NEGATIVE  


 


Urine Bilirubin NEGATIVE  NEGATIVE  


 


Urine Urobilinogen 0.2  < = 1.0  MG/DL


 


Urine Leukocyte Esterase TRACE H NEGATIVE  


 


Urine RBC (Auto) NEGATIVE  NEGATIVE  


 


Urine RBC 0-2   /HPF


 


Urine WBC 2-5   /HPF


 


Urine Squamous Epithelial


Cells 5-10 


  /HPF





 


Urine Crystals NONE   /LPF


 


Urine Bacteria MODERATE H  /HPF


 


Urine Casts NONE   /LPF


 


Urine Mucus MODERATE H  /LPF


 


Urine Culture Indicated YES   


 


Sodium Level 139  135-145  MMOL/L


 


Potassium Level 4.1  3.6-5.0  MMOL/L


 


Chloride Level 108 H   MMOL/L


 


Carbon Dioxide Level 21  21-32  MMOL/L


 


Anion Gap 10  5-14  MMOL/L


 


Blood Urea Nitrogen 12  7-18  MG/DL


 


Creatinine


 0.99 


 0.60-1.30


MG/DL


 


Estimat Glomerular Filtration


Rate > 60 


  





 


BUN/Creatinine Ratio 12   


 


Glucose Level 100    MG/DL


 


Calcium Level 9.7  8.5-10.1  MG/DL


 


Corrected Calcium 9.3  8.5-10.1  MG/DL


 


Total Bilirubin 0.4  0.1-1.0  MG/DL


 


Aspartate Amino Transf


(AST/SGOT) 20 


 5-34  U/L





 


Alanine Aminotransferase


(ALT/SGPT) 14 


 0-55  U/L





 


Alkaline Phosphatase 53    U/L


 


Total Protein 7.7  6.4-8.2  GM/DL


 


Albumin 4.5  3.2-4.5  GM/DL


 


Human Chorionic Gonadotropin,


Quant 392 H


 <5  MIU/ML





 


Serum Pregnancy Test,


Qualitative POSITIVE 


 NEGATIVE  











My Orders





Orders - GILBERTO PINTO MD


Cbc With Automated Diff (20 06:08)


Comprehensive Metabolic Panel (20 06:08)


Ua Culture If Indicated (20 06:08)


Ed Iv/Invasive Line Start (20 06:08)


Ns Iv 1000 Ml (Sodium Chloride 0.9%) (20 06:08)


Hcg,Qualitative Serum (20 06:08)


Urine Culture (20 06:04)


Hcg,Quantitative (20 06:37)


Us Ob<14 Wks Sngle W/Transvag (20 06:38)


Fentanyl  Injection (Sublimaze Injection (20 09:30)





Medications Given in ED





Current Medications








 Medications  Dose


 Ordered  Sig/Augie


 Route  Start Time


 Stop Time Status Last Admin


Dose Admin


 


 Fentanyl Citrate  50 mcg  ONCE  ONCE


 IVP  20 09:30


 20 09:31 DC 20 09:26


50 MCG








Vital Signs/I&O











 20





 05:57


 


Temp 36.7


 


Pulse 82


 


Resp 18


 


B/P (MAP) 141/90 (107)


 


Pulse Ox 99


 


O2 Delivery Room Air














Blood Pressure Mean:                    107











Progress


Progress Note #1:  


   Time:  08:29


Progress Note


Patient's urine demonstrated no blood which lowered the suspicion for ureteral 

stone.  However, the hCG test was positive.  Serum Quant was 392.  This raises 

suspicion for left ectopic pregnancy.  Ultrasound was obtained and was discussed

with the technician.  There is a cystic-like structure at the left ovary with a 

significant amount of free fluid nearby.  Radiology report is pending.  Patient 

is now requesting something for pain.


Progress Note #2:  


   Time:  09:37


Progress Note


Ultrasound was discussed with Dr. Mcdaniels.  It is suspicious for ruptured ectopic 

pregnancy which is the working diagnosis at this time.  I discussed the case 

with Dr. Arias.  He anticipates surgery today and requested patient be 

admitted to the women's services floor.





Diagnostic Imaging





   Diagonstic Imaging:  Ultrasound


   Plain Films/CT/US/NM/MRI:  pelvis


Comments


NAME:   BELL TORO


[facility] REC#:   [id number]


ACCOUNT#:   [id number]


PT STATUS:   REG ER


:   1980


PHYSICIAN:   GILBERTO PINTO MD


ADMIT DATE:   20/ER


***Draft***


Date of Exam:20





US OB<14 WKS SNGLE W/TRANSVAG








INDICATION: Severe pelvic pain. Patient has positive pregnancy


test.





The uterus is anteverted measuring 9.2 x 5.6 x 6.1 cm.


Endometrium is approximately 15 mm in thickness. No intrauterine


gestational sac is identified. No myometrial mass is detected.


Right ovary measures 3.9 x 2.5 x 2.4 cm. Right ovary contains a


small follicle. There is blood flow to the right ovary. Left


ovary measures 4.7 x 2.9 x 4.9 cm. There is a cystic lesion


within the left ovary. A definite fetal pole was not seen. There


is moderate amount of free fluid within the pelvis. In


particular, there appears to be some complex fluid in the left


adnexa which may represent hemorrhage. A ruptured ectopic


pregnancy cannot be excluded. No other abnormalities are


detected.





IMPRESSION: No evidence of intrauterine gestational sac. There is


a cystic lesion in the left ovary with moderate amount of complex


free fluid in the left adnexa suspicious for hemorrhage. In light


of patient's positive pregnancy test, ruptured ectopic cannot be


entirely excluded.





  Dictated on workstation # [id number]








Dict:   20


Trans:   20


RONN [id number]





Interpreted by:     LANETTE MCDANIELS MD


   Reviewed:  Reviewed by Me, Discussed w/Radiologist





Departure


Communication (Admissions)


Time/Spoke to Admitting Phy:  09:15


Dr. Arias





Impression





   Primary Impression:  


   Ruptured ectopic pregnancy


Disposition:   ADMITTED AS INPATIENT


Condition:  Stable





Admissions


Decision to Admit Reason:  Admit from ER (General)


Decision to Admit/Date:  2020


Time/Decision to Admit Time:  09:15





Departure-Patient Inst.


Referrals:  


BHAVANA HALLMAN MD (PCP/Family)


Primary Care Physician


Scripts


Oxycodone HCl/Acetaminophen (Percocet 5-325 mg Tablet) 1 Each Tablet


1 TAB PO Q4H for PAIN-MODERATE MDD 6 TABS for 7 Days, #10 TAB


   Prov: DARION ARIAS MD         20 


Ibuprofen (Ibuprofen) 800 Mg Tablet


800 MG PO Q6H PRN for PAIN, #60 TAB


   Prov: DARION ARIAS MD         20











GILBERTO PINTO MD        2020 08:30

## 2020-06-11 NOTE — XMS REPORT
CCD document using C-CDA

                             Created on: 2019



Suzy Howard

External Reference #: [id number]

: 1980

Sex: Female



Demographics





                          Address                   [address], [place]  [id number]

 

                          Home Phone                [phone]

 

                          Preferred Language        English

 

                          Marital Status            Unknown

 

                          Jain Affiliation     Unknown

 

                          Race                      White

 

                          Ethnic Group              Not  or 





Author





                          Author                    Suzy Sinha

 

                          Organization              [facility]

 

                          Address                   [address] [place]  [address]



 

                          Phone                     [phone]







Care Team Providers





                    Care Team Member Name Role                Phone

 

                          PP                        Unavailable

 

                          CCM                       Unavailable



                                            



Summary Purpose

          Interface Exchange                                                    
               



Insurance Providers

                      



                    Payer name                   Policy type / Coverage type    

               

Covered party ID                   Effective Begin Date                   

Effective End Date                

 

                    [employer]                   [employer]                  

 [id number]          

                          [id number]                   Unknown                



                                                                              



Family history

                      



Mother            



                          Diagnosis                   Age At Onset              

  

 

                          Hypertension                   Unknown                



            



Father            



                          Diagnosis                   Age At Onset              

  

 

                          Hyperlipidemia                   Unknown              

  



                                                                                
       



Social History

                      



                    Social History Element                   Codes              

     Description    

                                        Effective Dates                

 

                    Marital status                   Unknown                   M

arried              

                                        2013                

 

                    Number of children                   Unknown                

   2                

                                        2013                

 

                    Employment                   Unknown                   [employer]

ntly employed       

                                        2013                

 

                    Tobacco history                   SNOMED CT: [id number]      

             Never 

smoker                                  2013                

 

                    Alcohol history                   SNOMED CT: [id number]      

             Never 

drinks alcohol                          2013                

 

                          Has the patient ever used illegal drugs?              

     Unknown              

                          Has never used illegal drugs                   

013                



                                                                                
                                                         



Allergies, Adverse Reactions, Alerts

                      



                Substance                   Reaction                   Codes    

               

Entered Date                   Inactivated Date                   Status        

       

 

                                                            * NO KNOWN DRUG FAM

RGIES                                   

                                    Unknown                   2013        

           No 

Inactive Date                           Active                



                                                                              



Past Medical History

                      



                    Illness                   Codes                   Condition 

Status              

                          Onset Date                   Resolved Date            

    

 

                                                            Generalized anxiety 

disorder                                

                                        ICD-9: 300.02

ICD-10: F41.1                   Active                    2016            

 

                                        Unknown                

 

                                                            Systemic lupus eryth

ematosus, unspecified                   

                                        ICD-9: 710.0

ICD-10: M32.9                   Active                    2016            

 

                                        Unknown                

 

                                                            Encounter for genera

l adult medical examination with 

abnormal findings                                     ICD-9: V70.0

ICD-10: Z00.01                   Active                    2018           

 

                                        Unknown                

 

                                                            Enlarged lymph nodes

, unspecified                           

                                        ICD-9: 785.6

ICD-10: R59.9                   Active                    2015            

 

                                        Unknown                

 

                                                            Gastro-esophageal re

flux disease without esophagitis        

                                        ICD-9: 530.81

ICD-10: K21.9                   Active                    2015            

 

                                        Unknown                

 

                                              DYSURIA                           

          ICD-9: 788.1    

                    Active                   2015                   Unknow

n     

          

 

                                              Fatigue                           

          ICD-9: 780.79   

                    Active                   2015                   Unknow

n    

           

 

                                              Lupus                             

        ICD-9: 710.0      

                    Active                   2015                   Unknow

n       

        

 

                                              Anxiety, generalized              

                       

ICD-9: 300.02                   Active                    2014            

 

                                        Unknown                

 

                                              DEPRESSIVE DISORDER NEC           

                          

ICD-9: 311                   Active                    2014               

 

                                        Unknown                

 

                                              Iron deficiency                   

                  ICD-9: 

280.9                   Active                   2014                   

Unknown                

 

                                              Depression                        

             Unknown      

                    Active                   2014                   Unknow

n       

        

 

                                              Elevated blood pressure           

                          

ICD-9: 796.2                   Active                    2014             

 

                                        Unknown                

 

                                                            Hx of iron deficienc

y anemia                                

                    ICD-9: V12.3                   Active                             

                                        Unknown                



                                                                                
                                                                                
                                                        



Problems

                      



                    Condition                   Codes                   Effectiv

e Dates             

                                        Condition Status                

 

                                                            Generalized anxiety 

disorder                                

                                        ICD-9: 300.02

ICD-10: F41.1                   2016                   Active             

  

 

                                                            Systemic lupus eryth

ematosus, unspecified                   

                                        ICD-9: 710.0

ICD-10: M32.9                   2016                   Active             

  

 

                                                            Encounter for genera

l adult medical examination with 

abnormal findings                                     ICD-9: V70.0

ICD-10: Z00.01                   2018                   Active            

   

 

                                                            Enlarged lymph nodes

, unspecified                           

                                        ICD-9: 785.6

ICD-10: R59.9                   2015                   Active             

  

 

                                                            Gastro-esophageal re

flux disease without esophagitis        

                                        ICD-9: 530.81

ICD-10: K21.9                   2015                   Active             

  

 

                                              DYSURIA                           

          ICD-9: 788.1    

                          2015                   Active                

 

                                              Fatigue                           

          ICD-9: 780.79   

                          2015                   Active                

 

                                              Lupus                             

        ICD-9: 710.0      

                          2015                   Active                

 

                                              Anxiety, generalized              

                       

ICD-9: 300.02                   2014                   Active             

  

 

                                              DEPRESSIVE DISORDER NEC           

                          

ICD-9: 311                   2014                   Active                

 

                                              Iron deficiency                   

                  ICD-9: 

280.9                     2014                   Active                

 

                                              Depression                        

             Unknown      

                          2014                   Active                

 

                                              Elevated blood pressure           

                          

ICD-9: 796.2                   2014                   Active              

 

 

                                                            Hx of iron deficienc

y anemia                                

                    ICD-9: V12.3                   2014                   

Active          

     



                                                                                
                                                                                
                                                        



Medications

                      



                    Medication                   Codes                   Instruc

tions               

                    Start Date                   Stop Date                   Sta

tus              

                                        Fill Instructions                

 

                                              Plaquenil 200 mg tablet           

                          

RxNorm: 541289                   1 Tablet(s) PO daily                   

2018                   10/10/2018                   Inactive              

                                                        

 

                                              Plaquenil 200 mg tablet           

                          

RxNorm: 753846                   1 Tablet(s) PO daily                   

2018                   09/10/2018                   Inactive              

                                                        

 

                                                            Cymbalta 30 mg capsu

le,delayed release                      

                          RxNorm: 023762                   1 CAPSULE(S) PO DAILY

            

                    2016                   In

active        

                                        [SAVINGS FOR NON-COVERED DRUGS -- BIN:

3585, PCN: [id number], Group: 

XXXXX, ID# [id number], Questions: [phone]. THIS IS NOT INSURANCE.]          
     

 

                                                            Augmentin 500 mg-125

 mg tablet                              

                    RxNorm: 591451                   1 Tablet(s) PO TID         

          

2015                   Inactive              

                                                        

 

                                                            Augmentin 500 mg-125

 mg tablet                              

                    RxNorm: 140739                   1 Tablet(s) PO TID         

          

2015                   Inactive              

                                                        

 

                                                            Cymbalta 30 mg capsu

le,delayed release                      

                          RxNorm: 259604                   1 CAPSULE(S) PO DAILY

            

                    2015                   In

active        

                                        [SAVINGS FOR NON-COVERED DRUGS -- BIN:

3585, PCN: [id number], Group: 

XXXXX, ID# [id number], Questions: [phone]. THIS IS NOT INSURANCE.]          
     

 

                                                            Protonix 20 mg table

t,delayed release                       

                          RxNorm: 503265                   1 Tablet(s) PO daily 

             

                    2015                   In

active          

                                                        

 

                                                            Protonix 20 mg table

t,delayed release                       

                          RxNorm: 573523                   1 TABLET(S) PO DAILY 

             

                    2015                   In

active          

                                        **Patient requests 90 days supply**     

           

 

                                                            Bactrim  mg-16

0 mg tablet                             

                    RxNorm: 343780                   1 Tablet(s) PO BID         

          

2015                   Inactive              

                                        [SAVINGS FOR NON-COVERED DRUGS -- BIN:

3585, PCN: [id number], Group: XXXXX, 

ID# [id number],  Questions: [phone].  THIS IS NOT INSURANCE.]               


 

                                                            Bactrim  mg-16

0 mg tablet                             

                    RxNorm: 538982                   1 Tablet(s) PO BID         

          

2015                   Inactive              

                                                        

 

                                                            Cymbalta 30 mg capsu

le,delayed release                      

                          RxNorm: 963689                   1 Capsule(s) PO daily

            

                    2015                   In

active        

                                        [SAVINGS FOR NON-COVERED DRUGS -- BIN:00

3585, PCN: [id number], Group: 

XXXXX, ID# [id number],  Questions: [phone].  THIS IS NOT INSURANCE.]        
       

 

                                                            escitalopram 5 mg ta

blet                                    

                    RxNorm: 701267                   1 Tablet(s) PO QPM         

          2014                   Inactive                       

  

         

 

                                                            escitalopram 5 mg ta

blet                                    

                    RxNorm: 479363                   1 Tablet(s) PO QPM         

          2014                   Inactive                   

[SAVINGS FOR UNINSURED PATIENTS -- BIN:[id number], PCN: ASPROD1, Group: [id number], ID# 
[id number], Process claim through [employer], for questions: [phone]. THIS IS
 NOT INSURANCE.]                

 

                                                            escitalopram 10 mg t

ablet                                   

                    RxNorm: 904659                   1 Tablet(s) PO QHS         

          

2014                   Inactive              

                                                        

 

                                              Plaquenil 200 mg tablet           

                          

RxNorm: 798848                   1 Tablet(s) PO daily                   No Start

Date                   2018                   Inactive                    

              



                                                                                
                                                                                
                                                        



Medication Administered

          No Medication Administered data                                       
                            



Immunizations

                      



                Vaccine                   Codes                   Date          

         Status 

              

 

                    Influenza                   CVX: 141                                  

                                        completed                



                                                                              



Assessments

                      



                    Condition                   Codes                   Effectiv

e Dates             

  

 

                          Generalized anxiety disorder                   ICD-10:

 F41.1

ICD-9: 300.02                           2019                

 

                          Systemic lupus erythematosus, unspecified             

      ICD-10: M32.9

ICD-9: 710.0                            2019                

 

                                        Encounter for general adult medical exam

ination with abnormal findings          

                                        ICD-10: Z00.01

ICD-9: V70.0                            2018                

 

                          Enlarged lymph nodes, unspecified                   IC

D-10: R59.9

ICD-9: 785.6                            2015                

 

                          Gastro-esophageal reflux disease without esophagitis  

                 ICD-10: 

K21.9

ICD-9: 530.81                           2015                

 

                    DYSURIA                   ICD-9: 788.1                                

  

 

                    Fatigue                   ICD-9: 780.79                               

   

 

                    Depressive disorder                   ICD-9: 311            

       2015   

            

 

                    Lupus                   ICD-9: 710.0                                  



 

                    Anxiety, generalized                   ICD-9: 300.02        

           

2014                

 

                    Iron deficiency                   ICD-9: 280.9              

     2014     

          

 

                    Hx of iron deficiency anemia                   ICD-9: V12.3 

                  

2014                

 

                    Elevated blood pressure                   ICD-9: 796.2      

             

2014                



                                                                    



Reason For Visit

                      



                    Reason For Visit                   Effective Dates          

         Notes      

         

 

                    medication follow up                   2018           

                    

   

 

                    medication follow up                   2016           

                    

   

 

                    lymph node enlargement/mass                   2015    

                    

          

 

                    fatigue                   2015                        

           

 

                    depression                   2014                     

              

 

                    depression                   2014                     

              

 

                    fatigue                   2014                        

           



                                                                              



Results

                      



                    Observation                   Observation Code              

     Item           

                    Item Code                   Result                   Date   

             

 

                Lipid                   Ord30                   CHOL            

                

                          130 mg/dL                   2018                

 

                Lipid                   Ord30                   HDL             

                

                          52.0 mg/dl                   2018               

 

 

                Lipid                   Ord30                   TRIG            

                

                          50 mg/dL                   2018                

 

                Lipid                   Ord30                   LDL             

                

                          68 mg/dL                   2018                

 

                Lipid                   Ord30                   C/HDL           

                

                          2.5 Ratio                   2018                

 

                Sed Rate                   Ord21                   ESR          

                

                          11 mm/hr                   2018                

 

                    Cbc With Differential                   Ord2                

   WBC              

                                        5.15 K/ul                   2018  

              

 

                    Cbc With Differential                   Ord2                

   RBC              

                                        4.37 M/ul                   2018  

              

 

                    Cbc With Differential                   Ord2                

   HGB              

                                        12.9 g/dl                   2018  

              

 

                    Cbc With Differential                   Ord2                

   HCT              

                                        39.4 %                   2018     

           

 

                    Cbc With Differential                   Ord2                

   Neut%            

                                        59.1 %                   2018     

           

 

                    Cbc With Differential                   Ord2                

   MCV              

                                        90.2 fl                   2018    

            

 

                    Cbc With Differential                   Ord2                

   Lymph%           

                                        29.7 %                   2018     

           

 

                    Cbc With Differential                   Ord2                

   MCH              

                                        29.5 pg                   2018    

            

 

                    Cbc With Differential                   Ord2                

   Mono%            

                                        9.3 %                   2018      

          

 

                    Cbc With Differential                   Ord2                

   MCHC             

                                        32.7 pg                   2018    

            

 

                    Cbc With Differential                   Ord2                

   Eos%             

                                        1.7 %                   2018      

          

 

                    Cbc With Differential                   Ord2                

   PLT              

                                        354 K/ul                   2018   

             

 

                    Cbc With Differential                   Ord2                

   Baso%            

                                        0.2 %                   2018      

          

 

                    Cbc With Differential                   Ord2                

   RDW              

                                        13.9 %                   2018     

           

 

                    Cbc With Differential                   Ord2                

   Neut ABS#        

                                        3.04 K/ul                   2018  

           

  

 

                    Cbc With Differential                   Ord2                

   Lymph ABS#       

                                        1.53 K/ul                   2018  

          

   

 

                    Cbc With Differential                   Ord2                

   Mono ABS#        

                                        0.5 K/ul                   2018   

           

 

 

                    Cbc With Differential                   Ord2                

   Eos ABS#         

                                        0.1 K/ul                   2018   

            



 

                    Cbc With Differential                   Ord2                

   Baso ABS#        

                                        0.0 K/ul                   2018   

           

 

 

                Comp Metabolic                   Dsd703                   NA    

                

                          138 mEq/L                   2018                

 

                Comp Metabolic                   Mnh608                   K     

                

                          4.4 mEq/L                   2018                

 

                Comp Metabolic                   Djp284                   CL    

                

                          105 mEq/L                   2018                

 

                Comp Metabolic                   Ysj938                   CO2   

                

                          28.0 mEq/L                   2018               

 

 

                    Comp Metabolic                   Czp764                   AN

ION GAP             

                                        9                    2018         

       

 

                    Comp Metabolic                   Lhx442                   GL

UCOSE               

                                        90 mg/dL                   2018   

             

 

                    Comp Metabolic                   Kcy326                   Cr

eat                 

                                        0.7 mg/dL                   2018  

              

 

                    Comp Metabolic                   Tza893                   eG

FR                  

                                        104 ml/min/1.73m2                                  



 

                Comp Metabolic                   Nec562                   BUN   

                

                          15 mg/dL                   2018                

 

                    Comp Metabolic                   Zhk235                   B/

C Ratio             

                                        22.1 Ratio                   2018 

               

 

                    Comp Metabolic                   Njs830                   CA

LCIUM               

                                        9.4 mg/dL                   2018  

              

 

                    Comp Metabolic                   Ylt862                   AL

K PHOS              

                                        47 U/L                   2018     

           

 

                    Comp Metabolic                   Xnf481                   AS

T(SGOT)             

                                        17 U/L                   2018     

           

 

                    Comp Metabolic                   Fcm220                   AL

T(SGPT)             

                                        16 U/L                   2018     

           

 

                    Comp Metabolic                   Oin875                   BI

LI T                

                                        0.5 mg/dL                   2018  

              

 

                    Comp Metabolic                   Uft013                   AL

BUMIN               

                                        4.6 g/dL                   2018   

             

 

                    Comp Metabolic                   Epk742                   TP

RO                  

                                        7.2 g/dL                   2018   

             

 

                    Comp Metabolic                   Nqo010                   GL

OB                  

                                        2.6 g/dL                   2018   

             

 

                    Comp Metabolic                   Rci138                   A/

G Ratio             

                                        1.8 Ratio                   2018  

              

 

                    Comp Metabolic                   Mys252                   Os

mo                  

                                        276 mOsmo                   2018  

              

 

                Tsh                   Ord6                   TSH (3rd IS)       

                

                          0.61 uIU/mL                   2018              

  

 

                    C-Reactive Protein  Qnt                   Crqnt             

      CRP           

                                        0.4 mg/dl                   2018  

              

 

                    C-Reactive Protein  Qnt                   Crqnt             

      CRP           

                                        0.1 mg/dl                   2015  

              

 

                Mono                   Dit669                   MONO            

                

                          Negative                    2015                

 

                    Cbc With Differential                   Ord2                

   WBC              

                                        4.9 K/uL                   2015   

             

 

                    Cbc With Differential                   Ord2                

   LYM              

                                        1.5 K/uL                   2015   

             

 

                    Cbc With Differential                   Ord2                

   LYM%             

                                        31.6 %                   2015     

           

 

                    Cbc With Differential                   Ord2                

   NEUT/GRAN        

                                        2.9 K/uL                   2015   

           

 

 

                    Cbc With Differential                   Ord2                

   NEUT/GRAN %      

                                        58.8 %                   2015     

         

 

 

                    Cbc With Differential                   Ord2                

   MID              

                                        0.5 K/uL                   2015   

             

 

                    Cbc With Differential                   Ord2                

   MID%             

                                        9.6 %                   2015      

          

 

                    Cbc With Differential                   Ord2                

   RBC              

                                        4.27 M/uL                   2015  

              

 

                    Cbc With Differential                   Ord2                

   HGB              

                                        12.0 g/dL                   2015  

              

 

                    Cbc With Differential                   Ord2                

   HCT              

                                        38.5 %                   2015     

           

 

                    Cbc With Differential                   Ord2                

   MCV              

                                        90 fL                   2015      

          

 

                    Cbc With Differential                   Ord2                

   MCH              

                                        28 pg                   2015      

          

 

                    Cbc With Differential                   Ord2                

   MCHC             

                                        31 g/dL                   2015    

            

 

                    Cbc With Differential                   Ord2                

   PLT              

                                        349 K/uL                   2015   

             

 

                    Cbc With Differential                   Ord2                

   RDW              

                                        13.9 %                   2015     

           

 

                    Cbc With Differential                   Ord2                

   WBC              

                                        3.4 K/uL                   2015   

             

 

                    Cbc With Differential                   Ord2                

   LYM              

                                        1.4 K/uL                   2015   

             

 

                    Cbc With Differential                   Ord2                

   LYM%             

                                        41.8 %                   2015     

           

 

                    Cbc With Differential                   Ord2                

   NEUT/GRAN        

                                        1.7 K/uL                   2015   

           

 

 

                    Cbc With Differential                   Ord2                

   NEUT/GRAN %      

                                        48.6 %                   2015     

         

 

 

                    Cbc With Differential                   Ord2                

   MID              

                                        0.3 K/uL                   2015   

             

 

                    Cbc With Differential                   Ord2                

   MID%             

                                        9.6 %                   2015      

          

 

                    Cbc With Differential                   Ord2                

   RBC              

                                        4.37 M/uL                   2015  

              

 

                    Cbc With Differential                   Ord2                

   HGB              

                                        13.0 g/dL                   2015  

              

 

                    Cbc With Differential                   Ord2                

   HCT              

                                        38.8 %                   2015     

           

 

                    Cbc With Differential                   Ord2                

   MCV              

                                        89 fL                   2015      

          

 

                    Cbc With Differential                   Ord2                

   MCH              

                                        30 pg                   2015      

          

 

                    Cbc With Differential                   Ord2                

   MCHC             

                                        34 g/dL                   2015    

            

 

                    Cbc With Differential                   Ord2                

   PLT              

                                        315 K/uL                   2015   

             

 

                    Cbc With Differential                   Ord2                

   RDW              

                                        14.8 %                   2015     

           

 

                Comp Metabolic                   Nue653                   NA    

                

                          138 mEq/L                   2015                

 

                Comp Metabolic                   Bpx586                   K     

                

                          4.1 mEq/L                   2015                

 

                Comp Metabolic                   Qbs473                   CL    

                

                          101 mEq/L                   2015                

 

                Comp Metabolic                   Tlu283                   CO2   

                

                          25.0 mEq/L                   2015               

 

 

                    Comp Metabolic                   Jnd723                   AN

ION GAP             

                                        16                    2015        

        

 

                    Comp Metabolic                   Mid361                   GL

UCOSE               

                                        87 mg/dL                   2015   

             

 

                    Comp Metabolic                   Csh331                   Cr

eat                 

                                        0.8 mg/dL                   2015  

              

 

                    Comp Metabolic                   Hhn832                   eG

FR                  

                                        94 ml/min/1.73m2                                   

 

                Comp Metabolic                   Zwa569                   BUN   

                

                          12 mg/dL                   2015                

 

                    Comp Metabolic                   Izr417                   B/

C Ratio             

                                        16.0 Ratio                   2015 

               

 

                    Comp Metabolic                   Uaz270                   CA

LCIUM               

                                        9.9 mg/dL                   2015  

              

 

                    Comp Metabolic                   Xrt506                   AL

K PHOS              

                                        52 U/L                   2015     

           

 

                    Comp Metabolic                   Yxb832                   AS

T(SGOT)             

                                        24 U/L                   2015     

           

 

                    Comp Metabolic                   Ava070                   AL

T(SGPT)             

                                        18 U/L                   2015     

           

 

                    Comp Metabolic                   Aba433                   BI

LI T                

                                        0.4 mg/dL                   2015  

              

 

                    Comp Metabolic                   Kkj268                   AL

BUMIN               

                                        4.9 g/dL                   2015   

             

 

                    Comp Metabolic                   Daf121                   TP

RO                  

                                        7.6 g/dL                   2015   

             

 

                    Comp Metabolic                   Sjn194                   GL

OB                  

                                        2.7 g/dL                   2015   

             

 

                    Comp Metabolic                   Tta372                   A/

G Ratio             

                                        1.9 Ratio                   2015  

              

 

                    Comp Metabolic                   Ytg421                   Os

mo                  

                                        275 mOsmo                   2015  

              

 

                Lipid                   Ord30                   CHOL            

                

                          136 mg/dL                   2015                

 

                Lipid                   Ord30                   HDL             

                

                          54.0 mg/dl                   2015               

 

 

                Lipid                   Ord30                   TRIG            

                

                          46 mg/dL                   2015                

 

                Lipid                   Ord30                   LDL             

                

                          73 mg/dL                   2015                

 

                Lipid                   Ord30                   C/HDL           

                

                          2.5 Ratio                   2015                

 

                Tsh                   Ord6                   hTSH II            

                

                          1.08 uIU/mL                   2015              

  

 

                Sed Rate                   Ord21                   ESR          

                

                          10 mm/hr                   2015                

 

                    C-Reactive Protein  Qnt                   Crqnt             

      CRP           

                                        0.3 mg/dl                   2015  

              

 

                %SAT/TIBC                   [id number]                   TIBC      

                

                          388 UG/DL                   2014                

 

                    %SAT/TIBC                   3377360                   % SATU

RAT                 

                                        21 %                   2014       

         

 

                %SAT/TIBC                   2256179                   UIBC      

                

                          308 MCG/DL                   2014               

 

 

                    LIPID GRP                                      HDL TE

ST                  

                                        52 MG/DL                   2014   

             

 

                LIPID GRP                                      TRIG      

                

                          37 MG/DL                   2014                

 

                    LIPID GRP                                      TEST L

DL                  

                                        59 MG/DL                   2014   

             

 

                LIPID GRP                                      CHOL      

                

                          118 MG/DL                   2014                

 

                    LIPID GRP                                      RCHOL/

HDL                 

                                        2.27 RATIO                   2014 

               

 

                    IRON TEST                   [id number]                   IRON T

EST                 

                                        80 UG/DL                   2014   

             

 

                CBC                   [id number]                   WBC             

                

                          4.3 10e9/L                   2014               

 

 

                CBC                   [id number]                   RBC             

                

                          4.00 10e12/L                   2014             

   

 

                CBC                   [id number]                   HGB             

                

                          12.0 g/dL                   2014                

 

                CBC                   [id number]                   HCT DET         

                

                          36.2 %                    2014                

 

                CBC                   [id number]                   MCV             

                

                          90.5 fL                   2014                

 

                CBC                   [id number]                   MCH             

                

                          30.0 pg                   2014                

 

                CBC                   [id number]                   MCHC            

                

                          33.1 g/dL                   2014                

 

                CBC                   [id number]                   PLT             

                

                          349 10e9/L                   2014               

 

 

                CBC                   [id number]                   MPV             

                

                          9.8 fL                    2014                

 

                CBC                   [id number]                   [place] %           

                

                          54.6 %                    2014                

 

                CBC                   [id number]                   LY %            

                

                          32.5 %                    2014                

 

                CBC                   [id number]                   MON %           

                

                          11.1 %                    2014                

 

                CBC                   [id number]                   EOS  %          

                

                          1.6 %                     2014                

 

                CBC                   [id number]                   BASO %          

                

                          0.2 %                     2014                

 

                CBC                   [id number]                   RDW             

                

                          14.0 %                    2014                

 

                CBC                   [id number]                   ABS [place]         

                

                          2.35 10e9/L                   2014              

  

 

                CBC                   [id number]                   ABS LYMPH       

                

                          1.40 10e9/L                   2014              

  

 

                CBC                   [id number]                   ABS MONO        

                

                          0.48 10e9/L                   2014              

  

 

                CBC                   [id number]                   ABS EOS         

                

                          0.07 10e9/L                   2014              

  

 

                CBC                   [id number]                   ABS BASO        

                

                          0.01 10e9/L                   2014              

  

 

                CBC                   [id number]                   RDW-SD          

                

                          44.7 fL                   2014                

 

                TSH                   [id number]                   TSH             

                

                          1.277 uIU/ML                   2014             

   

 

                GFR CALC                   [id number]                   GFR AA     

                

                          >60 ML/MIN                   2014               

 

 

                    GFR CALC                   [id number]                   GFR NON

-AA                 

                                        >60 ML/MIN                   2014 

               

 

                CHEM 14                   [id number]                   AST         

                

                          20 U/L                    2014                

 

                CHEM 14                   [id number]                   ALT         

                

                          12 IU/L                   2014                

 

                CHEM 14                   [id number]                   BUN         

                

                          13 MG/DL                   2014                

 

                CHEM 14                   [id number]                   ALBUMIN     

                

                          4.7 GM/DL                   2014                

 

                CHEM 14                   [id number]                   CHLORIDE    

                

                          110 MMOL/L                   2014               

 

 

                CHEM 14                   [id number]                   BILI TOT    

                

                          0.6 MG/DL                   2014                

 

                CHEM 14                   [id number]                   ALK PHOS    

                

                          41 U/L                    2014                

 

                CHEM 14                   [id number]                   SODIUM      

                

                          140 MMOL/L                   2014               

 

 

                    CHEM 14                   [id number]                   CREATINI

NE                  

                                        0.75 MG/DL                   2014 

               

 

                CHEM 14                   [id number]                   CALCIUM     

                

                          9.4 MG/DL                   2014                

 

                CHEM 14                   [id number]                   POTASSIUM   

                

                          4.1 MMOL/L                   2014               

 

 

                CHEM 14                   [id number]                   PROT TOT    

                

                          7.1 GM/DL                   2014                

 

                CHEM 14                   [id number]                   GLUCOSE     

                

                          93 MG/DL                   2014                

 

                CHEM 14                   [id number]                   BICARB      

                

                          24 MMOL/L                   2014                

 

                CHEM 14                   [id number]                   ANION GAP   

                

                          6 MEQ/L                   2014                



                                                                                
                                                                                
                                                                                
                                             



Review of Systems

                      



                    System                   Result                   Effective 

Dates               



 

                    Constitutional                   No recent illness          

         2018 

              

 

                    Constitutional                   No anorexia                

   2018       

        

 

                    Constitutional                   No night sweats            

       2018   

            

 

                    Constitutional                   No chills                  

 2018         

      

 

                    Constitutional                   No diaphoresis             

      2018    

           

 

                    Constitutional                   fatigue                   0

2018           

    

 

                    Constitutional                   No fever                   

2018          

     

 

                    Constitutional                   No insomnia                

   2018       

        

 

                    Constitutional                   No malaise                 

  2018        

       

 

                    Constitutional                   No weight loss             

      2018    

           

 

                    Constitutional                   No weight gain             

      2018    

           

 

                    Constitutional                   No obesity                 

  2018        

       

 

                    Eyes                   No blindness                   2018                

 

                    Eyes                   No eye pain                   

018                

 

                    Eyes                   No vision change                               

   

 

                    Ears/Nose/Throat/Neck                   No dizziness        

           

2018                

 

                    Ears/Nose/Throat/Neck                   No headache         

          2018

               

 

                    Cardiovascular                   No chest pain/pressure     

              

2018                

 

                    Cardiovascular                   No dyspnea                 

  2018        

       

 

                    Respiratory                   No chest congestion           

        2018  

             

 

                    Respiratory                   No chest tightness            

       2018   

            

 

                    Respiratory                   No cough                                

  

 

                    Gastrointestinal                   No abdominal pain        

           

2018                

 

                    Genitourinary/Nephrology                   No anuria/oliguri

a                   

2018                

 

                    Genitourinary/Nephrology                   No dysuria       

            

2018                

 

                    Musculoskeletal                   No stiffness              

     2018     

          

 

                    Dermatologic                   No rash                                

  

 

                    Dermatologic                   No sores                               

   

 

                    Neurologic                   No dizziness                   

2018          

     

 

                    Neurologic                   No headache                   0

2018           

    

 

                    Psychiatric                   No anxiety                   0

2018           

    

 

                          Hematologic/Lymphatic                   No abnormal ec

chymoses                  

                                        2018                

 

                          Hematologic/Lymphatic                   No abnormal bl

eeding and bruising       

                                        2018                

 

                    Gastrointestinal                   No diarrhea              

     2018     

          

 

                    Gastrointestinal                   constipation             

      2018    

           

 

                    Musculoskeletal                   No joint complaint        

           

2018                

 

                    Musculoskeletal                   No back pain              

     2018     

          

 

                    Constitutional                   No recent illness          

         2016 

              

 

                    Constitutional                   No anorexia                

   2016       

        

 

                    Constitutional                   No night sweats            

       2016   

            

 

                    Constitutional                   No chills                  

 2016         

      

 

                    Constitutional                   No diaphoresis             

      2016    

           

 

                    Constitutional                   No fatigue                 

  2016        

       

 

                    Constitutional                   No fever                   

2016          

     

 

                    Constitutional                   No insomnia                

   2016       

        

 

                    Constitutional                   No weight loss             

      2016    

           

 

                    Constitutional                   No malaise                 

  2016        

       

 

                    Constitutional                   No weight gain             

      2016    

           

 

                    Constitutional                   No obesity                 

  2016        

       

 

                    Eyes                   No blindness                   2016                

 

                    Eyes                   No eye pain                   

016                

 

                    Eyes                   No vision change                               

   

 

                    Ears/Nose/Throat/Neck                   No dizziness        

           

2016                

 

                    Ears/Nose/Throat/Neck                   No headache         

          2016

               

 

                    Cardiovascular                   No chest pain/pressure     

              

2016                

 

                    Cardiovascular                   No dyspnea                 

  2016        

       

 

                    Respiratory                   No chest congestion           

        2016  

             

 

                    Respiratory                   No chest tightness            

       2016   

            

 

                    Respiratory                   No cough                                

  

 

                    Gastrointestinal                   No abdominal pain        

           

2016                

 

                    Gastrointestinal                   constipation             

      2016    

           

 

                    Genitourinary/Nephrology                   No anuria/oliguri

a                   

2016                

 

                    Genitourinary/Nephrology                   No dysuria       

            

2016                

 

                    Musculoskeletal                   No stiffness              

     2016     

          

 

                    Musculoskeletal                   back pain                 

  2016        

       

 

                    Dermatologic                   No rash                                

  

 

                    Dermatologic                   No sores                               

   

 

                    Neurologic                   No dizziness                   

2016          

     

 

                    Neurologic                   No headache                   1

2016           

    

 

                    Psychiatric                   No anxiety                   1

2016           

    

 

                          Hematologic/Lymphatic                   No abnormal ec

chymoses                  

                                        2016                

 

                          Hematologic/Lymphatic                   No abnormal bl

eeding and bruising       

                                        2016                

 

                    Constitutional                   No recent illness          

         2015 

              

 

                    Constitutional                   No chills                  

 2015         

      

 

                    Constitutional                   fatigue                   1

2015           

    

 

                    Constitutional                   No fever                   

2015          

     

 

                    Constitutional                   No insomnia                

   2015       

        

 

                    Eyes                   No blindness                   2015                

 

                    Eyes                   No vision change                               

   

 

                    Ears/Nose/Throat/Neck                   No dizziness        

           

2015                

 

                    Ears/Nose/Throat/Neck                   No headache         

          2015

               

 

                    Ears/Nose/Throat/Neck                   No hearing loss     

              

2015                

 

                    Ears/Nose/Throat/Neck                   No nasal allergies  

                 

2015                

 

                    Ears/Nose/Throat/Neck                   No sore throat      

             

2015                

 

                    Ears/Nose/Throat/Neck                   No postnasal drip   

                

2015                

 

                    Ears/Nose/Throat/Neck                   No sinus congestion 

                  

2015                

 

                    Cardiovascular                   No dyspnea                 

  2015        

       

 

                    Respiratory                   No cigarette smoking          

         2015 

              

 

                    Respiratory                   No cough                   2015             

  

 

                    Respiratory                   No dyspnea                   1

2015           

    

 

                    Gastrointestinal                   gastroesophageal reflux  

                 

2015                

 

                    Gastrointestinal                   No nausea                

   2015       

        

 

                    Gastrointestinal                   No vomiting              

     2015     

          

 

                    Musculoskeletal                   No stiffness              

     2015     

          

 

                    Musculoskeletal                   No swelling               

    2015      

         

 

                    Musculoskeletal                   No muscle weakness        

           

2015                

 

                    Musculoskeletal                   No myalgias               

    2015      

         

 

                    Dermatologic                   No rash                   2015             

  

 

                    Dermatologic                   No scar                   2015             

  

 

                    Neurologic                   No dizziness                   

2015          

     

 

                    Neurologic                   No headache                   1

2015           

    

 

                    Neurologic                   No neck pain                   

2015          

     

 

                    Neurologic                   No syncope                               

   

 

                    Psychiatric                   anxiety                   2015              

 

 

                    Psychiatric                   depression                   1

2015           

    

 

                    Ears/Nose/Throat/Neck                   No dental pain      

             

2015                

 

                    Cardiovascular                   No chest pain/pressure     

              

2015                

 

                    Cardiovascular                   No edema                   

2015          

     

 

                    Constitutional                   No recent illness          

         2015 

              

 

                    Constitutional                   No chills                  

 2015         

      

 

                    Constitutional                   fatigue                   0

2015           

    

 

                    Constitutional                   No fever                   

2015          

     

 

                    Constitutional                   No insomnia                

   2015       

        

 

                    Constitutional                   malaise                   0

2015           

    

 

                    Eyes                   No blindness                   2015                

 

                    Eyes                   No vision change                               

   

 

                    Ears/Nose/Throat/Neck                   No dental pain      

             

2015                

 

                    Ears/Nose/Throat/Neck                   No dizziness        

           

2015                

 

                    Ears/Nose/Throat/Neck                   No dysphagia        

           

2015                

 

                    Ears/Nose/Throat/Neck                   No headache         

          2015

               

 

                    Ears/Nose/Throat/Neck                   No hearing loss     

              

2015                

 

                    Ears/Nose/Throat/Neck                   No nasal allergies  

                 

2015                

 

                    Ears/Nose/Throat/Neck                   No sore throat      

             

2015                

 

                    Ears/Nose/Throat/Neck                   No postnasal drip   

                

2015                

 

                    Ears/Nose/Throat/Neck                   No sinus congestion 

                  

2015                

 

                    Cardiovascular                   No chest pain/pressure     

              

2015                

 

                    Cardiovascular                   No dyspnea                 

  2015        

       

 

                    Cardiovascular                   No edema                   

2015          

     

 

                    Cardiovascular                   No exercise intolerance    

               

2015                

 

                    Cardiovascular                   No fatigue                 

  2015        

       

 

                    Cardiovascular                   No near-syncope/dizziness  

                 

2015                

 

                    Respiratory                   No chest tightness            

       2015   

            

 

                    Respiratory                   No cigarette smoking          

         2015 

              

 

                    Respiratory                   No cough                                

  

 

                    Respiratory                   No dyspnea                   0

2015           

    

 

                    Respiratory                   No pedal edema                

   2015       

        

 

                    Respiratory                   No snoring                   0

2015           

    

 

                    Respiratory                   No wheezing                   

2015          

     

 

                    Gastrointestinal                   No hemorrhoids           

        2015  

             

 

                    Gastrointestinal                   No abdominal pain        

           

2015                

 

                    Gastrointestinal                   No constipation          

         2015 

              

 

                    Gastrointestinal                   No diarrhea              

     2015     

          

 

                    Gastrointestinal                   No gastroesophageal reflu

x                   

2015                

 

                    Gastrointestinal                   No melena                

   2015       

        

 

                    Gastrointestinal                   No nausea                

   2015       

        

 

                    Gastrointestinal                   No vomiting              

     2015     

          

 

                    Genitourinary/Nephrology                   No dysuria       

            

2015                

 

                    Genitourinary/Nephrology                   No nocturia      

             

2015                

 

                          Genitourinary/Nephrology                   No urinary 

incontinence              

                                        2015                

 

                    Musculoskeletal                   No stiffness              

     2015     

          

 

                    Musculoskeletal                   No swelling               

    2015      

         

 

                    Musculoskeletal                   No muscle weakness        

           

2015                

 

                    Musculoskeletal                   No myalgias               

    2015      

         

 

                    Dermatologic                   No rash                                

  

 

                    Dermatologic                   No scar                                

  

 

                    Neurologic                   No dizziness                   

2015          

     

 

                    Neurologic                   No headache                   0

2015           

    

 

                    Neurologic                   No neck pain                   

2015          

     

 

                    Neurologic                   No syncope                               

   

 

                    Psychiatric                   anxiety                   /2015              

 

 

                    Psychiatric                   depression                   0

2015           

    

 

                    Constitutional                   No recent illness          

         2014 

              

 

                    Constitutional                   No chills                  

 2014         

      

 

                    Constitutional                   No fatigue                 

  2014        

       

 

                    Constitutional                   No fever                   

2014          

     

 

                    Constitutional                   No insomnia                

   2014       

        

 

                    Constitutional                   No malaise                 

  2014        

       

 

                    Cardiovascular                   No chest pain/pressure     

              

2014                

 

                    Cardiovascular                   No dyspnea                 

  2014        

       

 

                    Cardiovascular                   No edema                   

2014          

     

 

                    Cardiovascular                   No exercise intolerance    

               

2014                

 

                    Cardiovascular                   No fatigue                 

  2014        

       

 

                    Cardiovascular                   No near-syncope/dizziness  

                 

2014                

 

                    Respiratory                   No chest tightness            

       2014   

            

 

                    Respiratory                   No cigarette smoking          

         2014 

              

 

                    Respiratory                   No cough                   2014             

  

 

                    Respiratory                   No dyspnea                   1

2014           

    

 

                    Respiratory                   No pedal edema                

   2014       

        

 

                    Respiratory                   No snoring                   1

2014           

    

 

                    Respiratory                   No wheezing                   

2014          

     

 

                    Psychiatric                   No anxiety                   1

2014           

    

 

                    Psychiatric                   No depression                 

  2014        

       

 

                    Gastrointestinal                   No hemorrhoids           

        2014  

             

 

                    Gastrointestinal                   No abdominal pain        

           

2014                

 

                    Gastrointestinal                   No constipation          

         2014 

              

 

                    Gastrointestinal                   No diarrhea              

     2014     

          

 

                    Gastrointestinal                   No gastroesophageal reflu

x                   

2014                

 

                    Gastrointestinal                   No melena                

   2014       

        

 

                    Gastrointestinal                   No nausea                

   2014       

        

 

                    Gastrointestinal                   No vomiting              

     2014     

          

 

                    Constitutional                   No recent illness          

         2014 

              

 

                    Constitutional                   No chills                  

 2014         

      

 

                    Constitutional                   No fatigue                 

  2014        

       

 

                    Constitutional                   No fever                   

2014          

     

 

                    Constitutional                   No insomnia                

   2014       

        

 

                    Constitutional                   No malaise                 

  2014        

       

 

                    Cardiovascular                   No chest pain/pressure     

              

2014                

 

                    Cardiovascular                   No dyspnea                 

  2014        

       

 

                    Cardiovascular                   No edema                   

2014          

     

 

                    Cardiovascular                   No exercise intolerance    

               

2014                

 

                    Cardiovascular                   No fatigue                 

  2014        

       

 

                    Cardiovascular                   No near-syncope/dizziness  

                 

2014                

 

                    Respiratory                   No chest tightness            

       2014   

            

 

                    Respiratory                   No cigarette smoking          

         2014 

              

 

                    Respiratory                   No cough                   2014             

  

 

                    Respiratory                   No dyspnea                   0

2014           

    

 

                    Respiratory                   No pedal edema                

   2014       

        

 

                    Respiratory                   No snoring                   0

2014           

    

 

                    Respiratory                   No wheezing                   

2014          

     

 

                    Psychiatric                   No anxiety                   0

2014           

    

 

                    Psychiatric                   No depression                 

  2014        

       

 

                    Constitutional                   No recent illness          

         2014 

              

 

                    Constitutional                   No chills                  

 2014         

      

 

                    Constitutional                   No fatigue                 

  2014        

       

 

                    Constitutional                   No fever                   

2014          

     

 

                    Constitutional                   No insomnia                

   2014       

        

 

                    Constitutional                   No malaise                 

  2014        

       

 

                    Cardiovascular                   No chest pain/pressure     

              

2014                

 

                    Cardiovascular                   No dyspnea                 

  2014        

       

 

                    Cardiovascular                   No edema                   

2014          

     

 

                    Cardiovascular                   No exercise intolerance    

               

2014                

 

                    Cardiovascular                   No fatigue                 

  2014        

       

 

                    Cardiovascular                   No near-syncope/dizziness  

                 

2014                

 

                    Respiratory                   No chest tightness            

       2014   

            

 

                    Respiratory                   No cigarette smoking          

         2014 

              

 

                    Respiratory                   No cough                   2014             

  

 

                    Respiratory                   No dyspnea                   0

2014           

    

 

                    Respiratory                   No pedal edema                

   2014       

        

 

                    Respiratory                   No snoring                   0

2014           

    

 

                    Respiratory                   No wheezing                   

2014          

     

 

                    Psychiatric                   anxiety                                 

 

 

                    Psychiatric                   depression                   0

2014           

    

 

                    Neurologic                   No dizziness                   

2014          

     

 

                    Neurologic                   No headache                   0

2014           

    

 

                    Neurologic                   No neck pain                   

2014          

     

 

                    Neurologic                   No syncope                               

   

 

                    Dermatologic                   No rash                   2014             

  

 

                    Dermatologic                   No scar                   2014             

  

 

                    Musculoskeletal                   No stiffness              

     2014     

          

 

                    Musculoskeletal                   No swelling               

    2014      

         

 

                    Musculoskeletal                   No muscle weakness        

           

2014                

 

                    Musculoskeletal                   No myalgias               

    2014      

         

 

                    Gastrointestinal                   No hemorrhoids           

        2014  

             

 

                    Gastrointestinal                   No abdominal pain        

           

2014                

 

                    Gastrointestinal                   No constipation          

         2014 

              

 

                    Gastrointestinal                   No diarrhea              

     2014     

          

 

                    Gastrointestinal                   No gastroesophageal reflu

x                   

2014                

 

                    Gastrointestinal                   No melena                

   2014       

        

 

                    Gastrointestinal                   No nausea                

   2014       

        

 

                    Gastrointestinal                   No vomiting              

     2014     

          

 

                    Eyes                   No blindness                   2014                

 

                    Eyes                   No vision change                               

   

 

                    Ears/Nose/Throat/Neck                   No dental pain      

             

2014                

 

                    Ears/Nose/Throat/Neck                   No dizziness        

           

2014                

 

                    Ears/Nose/Throat/Neck                   No dysphagia        

           

2014                

 

                    Ears/Nose/Throat/Neck                   No headache         

          2014

               

 

                    Ears/Nose/Throat/Neck                   No hearing loss     

              

2014                

 

                    Ears/Nose/Throat/Neck                   No nasal allergies  

                 

2014                

 

                    Ears/Nose/Throat/Neck                   No sore throat      

             

2014                

 

                    Ears/Nose/Throat/Neck                   No postnasal drip   

                

2014                

 

                    Ears/Nose/Throat/Neck                   No sinus congestion 

                  

2014                

 

                    Genitourinary/Nephrology                   No dysuria       

            

2014                

 

                    Genitourinary/Nephrology                   No nocturia      

             

2014                

 

                          Genitourinary/Nephrology                   No urinary 

incontinence              

                                        2014                



                                                                    



Physical Exam

                      



                    Exam Name                   System Name                   It

em Name             

                    Status                   Result                   Effective 

Dates          

                                        Notes                

 

                    Full Exam - General                    Constitutional   

                 

general appearance                   Development:                   well 

developed                   2018                   None                

 

                    Full Exam - General                    Constitutional   

                 

general appearance                   Development:                   appears 

stated age                   2018                   None                

 

                    Full Exam - General                    Constitutional   

                 

general appearance                   Overall:                   well developed  

                          2018                   None                

 

                    Full Exam - General                    Constitutional   

                 

general appearance                   Overall:                   in no acute 

distress                   2018                   None                

 

                    Full Exam - General                    Constitutional   

                 

general appearance                   Overall:                   well nourished  

                          2018                   None                

 

                    Full Exam - General                    Constitutional   

                 

general appearance                      Hygiene/Attention to Grooming:          

   

                    good hygiene                   2018                   

None           

    

 

                    Full Exam - General                    Eyes             

      

conjunctiva/eyelids                   Overall:                   conjunctiva 

clear                     2018                   None                

 

                    Full Exam - General                    Eyes             

      

conjunctiva/eyelids                   Overall:                   cornea clear   

                          2018                   None                

 

                    Full Exam - General                    Eyes             

      

conjunctiva/eyelids                   Overall:                   eyelids normal 

                          2018                   None                

 

                    Full Exam - General                    Eyes             

      pupils and 

irises                    Overall:                   pupils equal, round, 

reactive to light and accomodation                   2018                 

                                        None                

 

                    Full Exam - General                    Ears/Nose/Throat 

                  

otoscopic exam                   Overall:                   external auditory 

canals clear                   2018                   None                

 

                    Full Exam - General                    Ears/Nose/Throat 

                  

otoscopic exam                   Overall:                   tympanic membranes 

clear                     2018                   None                

 

                    Full Exam - General                    Ears/Nose/Throat 

                  

lips/teeth/gingiva                   Overall:                   benign lips     

                          2018                   None                

 

                    Full Exam - General                    Ears/Nose/Throat 

                  

lips/teeth/gingiva                   Overall:                   normal dentition

                          2018                   None                

 

                    Full Exam - General                    Ears/Nose/Throat 

                  

oral cavity/pharynx/larynx                    Overall:                   oral 

mucosa clear                   2018                   None                

 

                    Full Exam - General                    Ears/Nose/Throat 

                  

oral cavity/pharynx/larynx                    Overall:                   

oropharyngeal mucosa clear                   2018                   None  

             

 

                    Full Exam - General                    Ears/Nose/Throat 

                  

oral cavity/pharynx/larynx                    Overall:                   

hypopharynx benign                   2018                   None          

     

 

                    Full Exam - General                    Ears/Nose/Throat 

                  

oral cavity/pharynx/larynx                    Overall:                   no 

masses                    2018                   None                

 

                    Full Exam - General                    Respiratory      

             

auscultation                   Overall:                   breath sounds clear 

bilaterally                   2018                   None                

 

                    Full Exam - General                    Respiratory      

             

respiratory effort/rhythm                   Overall:                   no 

retractions                   2018                   None                

 

                    Full Exam - General                    Respiratory      

             

respiratory effort/rhythm                   Overall:                   normal 

rate                      2018                   None                

 

                    Full Exam - General                    Cardiovascular   

                

extremities                   Overall:                   no clubbing            

                          2018                   None                

 

                    Full Exam - General                    Cardiovascular   

                

auscultation of heart                   Overall:                   regular rate 

                          2018                   None                

 

                    Full Exam - General                    Cardiovascular   

                

auscultation of heart                   Overall:                   normal heart 

sounds                    2018                   None                

 

                    Full Exam - General                    Cardiovascular   

                

auscultation of heart                   Overall:                   no murmurs   

                          2018                   None                

 

                    Full Exam - General                    Abdomen          

         abdominal 

exam                      Overall:                   no tenderness              

   

                          2018                   None                

 

                    Full Exam - General                    Abdomen          

         abdominal 

exam                      Overall:                   normal bowel sounds        

   

                          2018                   None                

 

                    Full Exam - General                    Integument       

            

inspection of skin                   Overall:                   few scattered 

moles, no gross abnormalities                   2018                   

None                

 

                    Full Exam - General                    Neurologic       

            cranial 

nerves                    Overall:                   crainial nerves 2 - 12 

grossly intact                   2018                   None              

 

 

                    Full Exam - General                    Psychiatric      

             

orientation/consciousness                   Overall:                   oriented 

to person, place and time                   2018                   None   

            

 

                    Full Exam - General                    Psychiatric      

             mood 

and affect                   Overall:                   normal mood and affect  

                          2018                   None                

 

                    Full Exam - General                    Psychiatric      

             mood 

and affect                   Mood:                   happy                   

2018                              None                

 

                    Full Exam - General                    Constitutional   

                 

general appearance                   Development:                   well 

developed                   2016                   None                

 

                    Full Exam - General                    Constitutional   

                 

general appearance                   Development:                   appears 

stated age                   2016                   None                

 

                    Full Exam - General                    Constitutional   

                 

general appearance                   Overall:                   well developed  

                          2016                   None                

 

                    Full Exam - General                    Constitutional   

                 

general appearance                   Overall:                   in no acute 

distress                   2016                   None                

 

                    Full Exam - General                    Constitutional   

                 

general appearance                   Overall:                   well nourished  

                          2016                   None                

 

                    Full Exam - General                    Constitutional   

                 

general appearance                      Hygiene/Attention to Grooming:          

   

                    good hygiene                   2016                   

None           

    

 

                    Full Exam - General                    Eyes             

      

conjunctiva/eyelids                   Overall:                   conjunctiva 

clear                     2016                   None                

 

                    Full Exam - General                    Eyes             

      

conjunctiva/eyelids                   Overall:                   cornea clear   

                          2016                   None                

 

                    Full Exam - General                    Eyes             

      

conjunctiva/eyelids                   Overall:                   eyelids normal 

                          2016                   None                

 

                    Full Exam - General                    Eyes             

      pupils and 

irises                    Overall:                   pupils equal, round, 

reactive to light and accomodation                   2016                 

                                        None                

 

                    Full Exam - General                    Ears/Nose/Throat 

                  

otoscopic exam                   Overall:                   external auditory 

canals clear                   2016                   None                

 

                    Full Exam - General                    Ears/Nose/Throat 

                  

otoscopic exam                   Overall:                   tympanic membranes 

clear                     2016                   None                

 

                    Full Exam - General                    Ears/Nose/Throat 

                  

lips/teeth/gingiva                   Overall:                   benign lips     

                          2016                   None                

 

                    Full Exam - General                    Ears/Nose/Throat 

                  

lips/teeth/gingiva                   Overall:                   normal dentition

                          2016                   None                

 

                    Full Exam - General                    Ears/Nose/Throat 

                  

oral cavity/pharynx/larynx                    Overall:                   oral 

mucosa clear                   2016                   None                

 

                    Full Exam - General                    Ears/Nose/Throat 

                  

oral cavity/pharynx/larynx                    Overall:                   

oropharyngeal mucosa clear                   2016                   None  

             

 

                    Full Exam - General                    Ears/Nose/Throat 

                  

oral cavity/pharynx/larynx                    Overall:                   

hypopharynx benign                   2016                   None          

     

 

                    Full Exam - General                    Ears/Nose/Throat 

                  

oral cavity/pharynx/larynx                    Overall:                   no 

masses                    2016                   None                

 

                    Full Exam - General                    Respiratory      

             

auscultation                   Overall:                   breath sounds clear 

bilaterally                   2016                   None                

 

                    Full Exam - General                    Respiratory      

             

respiratory effort/rhythm                   Overall:                   no 

retractions                   2016                   None                

 

                    Full Exam - General                    Respiratory      

             

respiratory effort/rhythm                   Overall:                   normal 

rate                      2016                   None                

 

                    Full Exam - General                    Cardiovascular   

                

extremities                   Overall:                   no clubbing            

                          2016                   None                

 

                    Full Exam - General                    Cardiovascular   

                

auscultation of heart                   Overall:                   regular rate 

                          2016                   None                

 

                    Full Exam - General                    Cardiovascular   

                

auscultation of heart                   Overall:                   normal heart 

sounds                    2016                   None                

 

                    Full Exam - General                    Cardiovascular   

                

auscultation of heart                   Overall:                   no murmurs   

                          2016                   None                

 

                    Full Exam - General                    Neurologic       

            cranial 

nerves                    Overall:                   crainial nerves 2 - 12 

grossly intact                   2016                   None              

 

 

                    Full Exam - General                    Psychiatric      

             

orientation/consciousness                   Overall:                   oriented 

to person, place and time                   2016                   None   

            

 

                    Full Exam - General                    Psychiatric      

             mood 

and affect                   Overall:                   normal mood and affect  

                          2016                   None                

 

                    Full Exam - General                    Psychiatric      

             mood 

and affect                   Mood:                   happy                   

2016                              None                

 

                    Full Exam - General                    Abdomen          

         abdominal 

exam                      Overall:                   no tenderness              

   

                          2016                   None                

 

                    Full Exam - General                    Abdomen          

         abdominal 

exam                      Overall:                   normal bowel sounds        

   

                          2016                   None                

 

                    Full Exam - General                    Integument       

            

inspection of skin                   Overall:                   few scattered 

moles, no gross abnormalities                   2016                   

None                

 

                    Full Exam - General                    Constitutional   

                 

general appearance                   Development:                   well 

developed                   2015                   None                

 

                    Full Exam - General                    Constitutional   

                 

general appearance                   Development:                   appears 

stated age                   2015                   None                

 

                    Full Exam - General                    Constitutional   

                 

general appearance                   Overall:                   well developed  

                          2015                   None                

 

                    Full Exam - General                    Constitutional   

                 

general appearance                   Overall:                   in no acute 

distress                   2015                   None                

 

                    Full Exam - General                    Constitutional   

                 

general appearance                   Overall:                   well nourished  

                          2015                   None                

 

                    Full Exam - General                    Constitutional   

                 

general appearance                      Hygiene/Attention to Grooming:          

   

                    good hygiene                   2015                   

None           

    

 

                    Full Exam - General                    Eyes             

      

conjunctiva/eyelids                   Overall:                   conjunctiva 

clear                     2015                   None                

 

                    Full Exam - General                    Eyes             

      

conjunctiva/eyelids                   Overall:                   cornea clear   

                          2015                   None                

 

                    Full Exam - General                    Eyes             

      

conjunctiva/eyelids                   Overall:                   eyelids normal 

                          2015                   None                

 

                    Full Exam - General                    Eyes             

      pupils and 

irises                    Overall:                   pupils equal, round, 

reactive to light and accomodation                   2015                 

                                        None                

 

                    Full Exam - General                    Ears/Nose/Throat 

                  

otoscopic exam                   Overall:                   external auditory 

canals clear                   2015                   None                

 

                    Full Exam - General                    Ears/Nose/Throat 

                  

otoscopic exam                   Overall:                   tympanic membranes 

clear                     2015                   None                

 

                    Full Exam - General                    Ears/Nose/Throat 

                  

lips/teeth/gingiva                   Overall:                   benign lips     

                          2015                   None                

 

                    Full Exam - General                    Ears/Nose/Throat 

                  

lips/teeth/gingiva                   Overall:                   normal dentition

                          2015                   None                

 

                    Full Exam - General                    Ears/Nose/Throat 

                  

oral cavity/pharynx/larynx                    Overall:                   oral 

mucosa clear                   2015                   None                

 

                    Full Exam - General                    Ears/Nose/Throat 

                  

oral cavity/pharynx/larynx                    Overall:                   

oropharyngeal mucosa clear                   2015                   None  

             

 

                    Full Exam - General                    Ears/Nose/Throat 

                  

oral cavity/pharynx/larynx                    Overall:                   

hypopharynx benign                   2015                   None          

     

 

                    Full Exam - General                    Ears/Nose/Throat 

                  

oral cavity/pharynx/larynx                    Overall:                   no 

masses                    2015                   None                

 

                    Full Exam - General                    Respiratory      

             

auscultation                   Overall:                   breath sounds clear 

bilaterally                   2015                   None                

 

                    Full Exam - General                    Respiratory      

             

respiratory effort/rhythm                   Overall:                   no 

retractions                   2015                   None                

 

                    Full Exam - General                    Respiratory      

             

respiratory effort/rhythm                   Overall:                   normal 

rate                      2015                   None                

 

                    Full Exam - General                    Cardiovascular   

                

extremities                   Overall:                   no clubbing            

                          2015                   None                

 

                    Full Exam - General                    Cardiovascular   

                

auscultation of heart                   Overall:                   regular rate 

                          2015                   None                

 

                    Full Exam - General                    Cardiovascular   

                

auscultation of heart                   Overall:                   normal heart 

sounds                    2015                   None                

 

                    Full Exam - General                    Cardiovascular   

                

auscultation of heart                   Overall:                   no murmurs   

                          2015                   None                

 

                    Full Exam - General                    Neurologic       

            cranial 

nerves                    Overall:                   crainial nerves 2 - 12 

grossly intact                   2015                   None              

 

 

                    Full Exam - General                    Psychiatric      

             

orientation/consciousness                   Overall:                   oriented 

to person, place and time                   2015                   None   

            

 

                    Full Exam - General                    Psychiatric      

             mood 

and affect                   Overall:                   normal mood and affect  

                          2015                   None                

 

                    Full Exam - General                    Lymphatic        

           neck 

nodes                     Anterior cervical chain:                   shotty     

  

                          2015                   None                

 

                    Full Exam - General                    Lymphatic        

           neck 

nodes                     Posterior cervical chain:                   soft      

  

                          2015                   None                

 

                    Full Exam - General                    Lymphatic        

           neck 

nodes                     Posterior cervical chain:                   mobile    

  

                          2015                   None                

 

                    Full Exam - General                    Lymphatic        

           neck 

nodes                     Posterior cervical chain:                   tender    

  

                          2015                   None                

 

                    Full Exam - General                    Lymphatic        

           neck 

nodes                     Posterior cervical chain:                   Left size 

(cm): 1                   2015                   None                

 

                    Full Exam - General                    Constitutional   

                 

general appearance                   Development:                   well 

developed                   2015                   None                

 

                    Full Exam - General                    Constitutional   

                 

general appearance                   Development:                   appears 

stated age                   2015                   None                

 

                    Full Exam - General                    Constitutional   

                 

general appearance                   Overall:                   well developed  

                          2015                   None                

 

                    Full Exam - General                    Constitutional   

                 

general appearance                   Overall:                   in no acute 

distress                   2015                   None                

 

                    Full Exam - General                    Constitutional   

                 

general appearance                   Overall:                   well nourished  

                          2015                   None                

 

                    Full Exam - General                    Constitutional   

                 

general appearance                      Hygiene/Attention to Grooming:          

   

                    good hygiene                   2015                   

None           

    

 

                    Full Exam - General                    Eyes             

      

conjunctiva/eyelids                   Overall:                   conjunctiva 

clear                     2015                   None                

 

                    Full Exam - General                    Eyes             

      

conjunctiva/eyelids                   Overall:                   cornea clear   

                          2015                   None                

 

                    Full Exam - General                    Eyes             

      

conjunctiva/eyelids                   Overall:                   eyelids normal 

                          2015                   None                

 

                    Full Exam - General                    Eyes             

      pupils and 

irises                    Overall:                   pupils equal, round, 

reactive to light and accomodation                   2015                 

                                        None                

 

                    Full Exam - General                    Ears/Nose/Throat 

                  

otoscopic exam                   Overall:                   external auditory 

canals clear                   2015                   None                

 

                    Full Exam - General                    Ears/Nose/Throat 

                  

otoscopic exam                   Overall:                   tympanic membranes 

clear                     2015                   None                

 

                    Full Exam - General                    Ears/Nose/Throat 

                  

lips/teeth/gingiva                   Overall:                   benign lips     

                          2015                   None                

 

                    Full Exam - General                    Ears/Nose/Throat 

                  

lips/teeth/gingiva                   Overall:                   normal dentition

                          2015                   None                

 

                    Full Exam - General                    Ears/Nose/Throat 

                  

oral cavity/pharynx/larynx                    Overall:                   oral 

mucosa clear                   2015                   None                

 

                    Full Exam - General                    Ears/Nose/Throat 

                  

oral cavity/pharynx/larynx                    Overall:                   

oropharyngeal mucosa clear                   2015                   None  

             

 

                    Full Exam - General                    Ears/Nose/Throat 

                  

oral cavity/pharynx/larynx                    Overall:                   

hypopharynx benign                   2015                   None          

     

 

                    Full Exam - General                    Ears/Nose/Throat 

                  

oral cavity/pharynx/larynx                    Overall:                   no 

masses                    2015                   None                

 

                    Full Exam - General                    Respiratory      

             

auscultation                   Overall:                   breath sounds clear 

bilaterally                   2015                   None                

 

                    Full Exam - General                    Respiratory      

             

respiratory effort/rhythm                   Overall:                   no 

retractions                   2015                   None                

 

                    Full Exam - General                    Respiratory      

             

respiratory effort/rhythm                   Overall:                   normal 

rate                      2015                   None                

 

                    Full Exam - General                    Cardiovascular   

                

extremities                   Overall:                   no clubbing            

                          2015                   None                

 

                    Full Exam - General                    Cardiovascular   

                

auscultation of heart                   Overall:                   regular rate 

                          2015                   None                

 

                    Full Exam - General                    Cardiovascular   

                

auscultation of heart                   Overall:                   normal heart 

sounds                    2015                   None                

 

                    Full Exam - General                    Cardiovascular   

                

auscultation of heart                   Overall:                   no murmurs   

                          2015                   None                

 

                    Full Exam - General                    Neurologic       

            cranial 

nerves                    Overall:                   crainial nerves 2 - 12 

grossly intact                   2015                   None              

 

 

                    Full Exam - General                    Psychiatric      

             

orientation/consciousness                   Overall:                   oriented 

to person, place and time                   2015                   None   

            

 

                    Full Exam - General                    Psychiatric      

             mood 

and affect                   Overall:                   normal mood and affect  

                          2015                   None                

 

                    Full Exam - General                    Psychiatric      

             mood 

and affect                   Mood:                   happy                   

2015                              None                

 

                    Full Exam - General                    Constitutional   

                 

general appearance                   Overall:                   well developed  

                          2014                   None                

 

                    Full Exam - General                    Constitutional   

                 

general appearance                   Overall:                   in no acute 

distress                   2014                   None                

 

                    Full Exam - General                    Constitutional   

                 

general appearance                   Overall:                   well nourished  

                          2014                   None                

 

                    Full Exam - General                    Respiratory      

             

auscultation                   Overall:                   breath sounds clear 

bilaterally                   2014                   None                

 

                    Full Exam - General                    Respiratory      

             

respiratory effort/rhythm                   Overall:                   no 

retractions                   2014                   None                

 

                    Full Exam - General                    Respiratory      

             

respiratory effort/rhythm                   Overall:                   normal 

rate                      2014                   None                

 

                    Full Exam - General                    Cardiovascular   

                

extremities                   Overall:                   no clubbing            

                          2014                   None                

 

                    Full Exam - General                    Cardiovascular   

                

auscultation of heart                   Overall:                   regular rate 

                          2014                   None                

 

                    Full Exam - General                    Cardiovascular   

                

auscultation of heart                   Overall:                   normal heart 

sounds                    2014                   None                

 

                    Full Exam - General                    Cardiovascular   

                

auscultation of heart                   Overall:                   no murmurs   

                          2014                   None                

 

                    Full Exam - General                    Psychiatric      

             

orientation/consciousness                   Overall:                   oriented 

to person, place and time                   2014                   None   

            

 

                    Full Exam - General                    Psychiatric      

             mood 

and affect                   Overall:                   normal mood and affect  

                          2014                   None                

 

                    Full Exam - General                    Psychiatric      

             mood 

and affect                   Mood:                   happy                   

2014                              None                

 

                    Full Exam - General                    Constitutional   

                 

general appearance                   Overall:                   well nourished  

                          2014                   None                

 

                    Full Exam - General                    Constitutional   

                 

general appearance                   Overall:                   well developed  

                          2014                   None                

 

                    Full Exam - General                    Constitutional   

                 

general appearance                   Overall:                   in no acute 

distress                   2014                   None                

 

                    Full Exam - General                    Psychiatric      

             mood 

and affect                   Mood:                   happy                   

2014                              None                

 

                    Full Exam - General                    Psychiatric      

             mood 

and affect                   Overall:                   normal mood and affect  

                          2014                   None                

 

                    Full Exam - General                    Psychiatric      

             

orientation/consciousness                   Overall:                   oriented 

to person, place and time                   2014                   None   

            

 

                    Full Exam - General                    Cardiovascular   

                

auscultation of heart                   Overall:                   regular rate 

                          2014                   None                

 

                    Full Exam - General                    Cardiovascular   

                

auscultation of heart                   Overall:                   normal heart 

sounds                    2014                   None                

 

                    Full Exam - General                    Cardiovascular   

                

auscultation of heart                   Overall:                   no murmurs   

                          2014                   None                

 

                    Full Exam - General                    Cardiovascular   

                

extremities                   Overall:                   no clubbing            

                          2014                   None                

 

                    Full Exam - General                    Respiratory      

             

respiratory effort/rhythm                   Overall:                   normal 

rate                      2014                   None                

 

                    Full Exam - General                    Respiratory      

             

respiratory effort/rhythm                   Overall:                   no 

retractions                   2014                   None                

 

                    Full Exam - General                    Respiratory      

             

auscultation                   Overall:                   breath sounds clear 

bilaterally                   2014                   None                

 

                    Full Exam - General                    Constitutional   

                 

general appearance                   Overall:                   well developed  

                          2014                   None                

 

                    Full Exam - General                    Constitutional   

                 

general appearance                   Overall:                   in no acute 

distress                   2014                   None                

 

                    Full Exam - General                    Constitutional   

                 

general appearance                   Overall:                   well nourished  

                          2014                   None                

 

                    Full Exam - General                    Respiratory      

             

auscultation                   Overall:                   breath sounds clear 

bilaterally                   2014                   None                

 

                    Full Exam - General                    Respiratory      

             

respiratory effort/rhythm                   Overall:                   no 

retractions                   2014                   None                

 

                    Full Exam - General                    Respiratory      

             

respiratory effort/rhythm                   Overall:                   normal 

rate                      2014                   None                

 

                    Full Exam - General                    Cardiovascular   

                

extremities                   Overall:                   no clubbing            

                          2014                   None                

 

                    Full Exam - General                    Cardiovascular   

                

auscultation of heart                   Overall:                   regular rate 

                          2014                   None                

 

                    Full Exam - General                    Cardiovascular   

                

auscultation of heart                   Overall:                   normal heart 

sounds                    2014                   None                

 

                    Full Exam - General                    Cardiovascular   

                

auscultation of heart                   Overall:                   no murmurs   

                          2014                   None                

 

                    Full Exam - General                    Psychiatric      

             

orientation/consciousness                   Overall:                   oriented 

to person, place and time                   2014                   None   

            

 

                    Full Exam - General                    Psychiatric      

             mood 

and affect                   Overall:                   normal mood and affect  

                          2014                   None                

 

                    Full Exam - General                    Psychiatric      

             mood 

and affect                   Mood:                   happy                   

2014                              None                

 

                    Full Exam - General                    Constitutional   

                 

general appearance                   Development:                   appears 

stated age                   2014                   None                

 

                    Full Exam - General                    Constitutional   

                 

general appearance                   Development:                   well 

developed                   2014                   None                

 

                    Full Exam - General                    Constitutional   

                 

general appearance                      Hygiene/Attention to Grooming:          

   

                    good hygiene                   2014                   

None           

    

 

                    Full Exam - General                    Eyes             

      

conjunctiva/eyelids                   Overall:                   conjunctiva 

clear                     2014                   None                

 

                    Full Exam - General                    Eyes             

      

conjunctiva/eyelids                   Overall:                   cornea clear   

                          2014                   None                

 

                    Full Exam - General                    Eyes             

      

conjunctiva/eyelids                   Overall:                   eyelids normal 

                          2014                   None                

 

                    Full Exam - General                    Eyes             

      pupils and 

irises                    Overall:                   pupils equal, round, 

reactive to light and accomodation                   2014                 

                                        None                

 

                    Full Exam - General                    Ears/Nose/Throat 

                  

otoscopic exam                   Overall:                   external auditory 

canals clear                   2014                   None                

 

                    Full Exam - General                    Ears/Nose/Throat 

                  

otoscopic exam                   Overall:                   tympanic membranes 

clear                     2014                   None                

 

                    Full Exam - General                    Ears/Nose/Throat 

                  

lips/teeth/gingiva                   Overall:                   benign lips     

                          2014                   None                

 

                    Full Exam - General                    Ears/Nose/Throat 

                  

lips/teeth/gingiva                   Overall:                   normal dentition

                          2014                   None                

 

                    Full Exam - General                    Ears/Nose/Throat 

                  

oral cavity/pharynx/larynx                    Overall:                   

hypopharynx benign                   2014                   None          

     

 

                    Full Exam - General                    Ears/Nose/Throat 

                  

oral cavity/pharynx/larynx                    Overall:                   no 

masses                    2014                   None                

 

                    Full Exam - General                    Ears/Nose/Throat 

                  

oral cavity/pharynx/larynx                    Overall:                   oral 

mucosa clear                   2014                   None                

 

                    Full Exam - General                    Ears/Nose/Throat 

                  

oral cavity/pharynx/larynx                    Overall:                   

oropharyngeal mucosa clear                   2014                   None  

             

 

                    Full Exam - General                    Abdomen          

         abdominal 

exam                      Overall:                   no tenderness              

   

                          2014                   None                

 

                    Full Exam - General                    Abdomen          

         abdominal 

exam                      Overall:                   normal bowel sounds        

   

                          2014                   None                

 

                    Full Exam - General                    Integument       

            

inspection of skin                   Overall:                   few scattered 

moles, no gross abnormalities                   2014                   

None                

 

                    Full Exam - General                    Neurologic       

            deep 

tendon reflexes                   Overall:                   deep tendon 

reflexes intact                   2014                   None             

  

 

                    Full Exam - General                    Neurologic       

            cranial 

nerves                    Overall:                   crainial nerves 2 - 12 

grossly intact                   2014                   None              

 



                                                                              



Procedures

                      



                    Procedure                   Codes                   Date    

            

 

                                                            URINALYSIS NONAUTO W

/O SCOPE                                

                          CPT-4: 44152                   2015             

   

 

                                                            IRON TEST (ASSAY OF 

IRON)                                   

                          CPT-4: 45566                   2014             

   

 

                                                            TSH (ASSAY THYROID S

REKHA HORMONE)                            

                          CPT-4: 02360                   2014             

   

 

                                                            CBC (COMPLETE CBC W/

AUTO DIFF WBC)                          

                          CPT-4: 27059                   2014             

   

 

                                                            CHEM 14 (COMPREHEN M

ETABOLIC PANEL)                         

                          CPT-4: 06035                   2014             

   

 

                                              LIPID GRP (LIPID PANEL)           

                          

CPT-4: 97975                            2014                

 

                                              ROUTINE VENIPUNCTURE              

                       

CPT-4: 11867                            2014                

 

                                              %sat/tibc                         

            CPT-4: 71181  

                                        2014                



                                                                                
                                                                   



Vital Signs

                      



                          Date                      Vital                

 

                          2018                                       Blood

 Pressure 1: 112/68 Code: 

8480-6                                                         BMI: 28.3 Code: 

84942-6                                                         Heart Rate 1: 72

bpm                                                         Height: 5'5"        

                                                            SpO2: 99%           

            

                                                            Weight: 170 lbs     

                           

  

 

                          2016                                       Blood

 Pressure 1: 110/72 Code: 

8480-6                                                         Heart Rate 1: 76 

bpm                                                         Height: 5'5"        

                                                            SpO2: 96%           

            

          

 

                          2015                                       Blood

 Pressure 1: 140/92 Code: 

8480-6                                                         BMI: 27.8 Code: 

57802-0                                                         Heart Rate 1: 87

bpm                                                         Height: 5'5"        

                                                            SpO2: 98%           

            

                                                            Weight: 167 lbs     

                           

  

 

                          2015                                       Blood

 Pressure 1: 116/78 Code: 

8480-6                                                         BMI: 27.1 Code: 

58979-7                                                         Heart Rate 1: 60

bpm                                                         Height: 5'5"        

                                                            Weight: 163 lbs     

            

                 

 

                          2014                                       Blood

 Pressure 1: 128/82 Code: 

8480-6                                                         BMI: 27.5 Code: 

82168-2                                                         Heart Rate 1: 60

bpm                                                         Height: 5'5"        

                                                            Weight: 165 lbs     

            

                 

 

                          2014                                       Blood

 Pressure 1: 114/76 Code: 

8480-6                                                         BMI: 26.6 Code: 

53915-6                                                         Height: 5'5"    

                                                            Weight: 160 lbs     

        

                     

 

                          2014                                       Blood

 Pressure 1: 130/90 Code: 

8480-6                                                         BMI: 27.0 Code: 

46762-2                                                         Heart Rate 1: 52

bpm                                                         Height: 5'5"        

                                                            Weight: 162 lbs     

            

                 



                                                                                
                                                                   



Functional Status

          No Functional Status data                                             
            



History of Present Illness

                      



                    Symptom Name                   Status                   Resu

lt                  

                          Effective Date                   Notes                

 

                    medication follow up                   Additional Comments  

                 

medication use                   2018                   None              

 

 

                    medication follow up                   Location             

      oral intake   

                          2018                   None                

 

                    medication follow up                   Quality              

     chronic        

                          2018                   None                

 

                    medication follow up                   Location             

      oral intake   

                          2016                   None                

 

                    depression                   Quality                   impro

ving                

                          2016                   None                

 

                    depression                   Onset and Resolution           

        resolved    

                          2016                   None                

 

                    depression                   Onset of Symptom               

    during adulthood

                          2016                   None                

 

                    depression                   Limitation on Activities       

            does not

limit activities                   2016                   None            

   

 

                    depression                   Frequency of Episodes          

         decreasing 

                          2016                   None                

 

                    depression                   Significant Medical Conditions 

                  

anxiety disorder                   2016                   None            

   

 

                    depression                   Triggers                   stre

ss                  

                          2016                   None                

 

                    depression                   Pertinent Findings             

      Denies 

irritability                   2016                   None                

 

                    depression                   Pertinent Findings             

      Denies 

lethargy                   2016                   None                

 

                    lymph node enlargement/mass                   Quality       

            soft    

                          2015                   None                

 

                    lymph node enlargement/mass                   Quality       

            firm    

                          2015                   None                

 

                          lymph node enlargement/mass                   Onset an

d Resolution              

                    gradual in onset                   2015               

    None        

       

 

                          lymph node enlargement/mass                   Onset of

 Symptom                  

                    1 months ago                   2015                   

None                

 

                          lymph node enlargement/mass                   Frequenc

y of Episodes             

                    daily                   2015                   None   

             

 

                    constipation                   Quality                   1-2

 stools per week    

                          2015                   None                

 

                    constipation                   Onset and Resolution         

          ongoing   

                          2015                   None                

 

                    fatigue                   Onset and Resolution              

     ongoing        

                          2015                   Denies that she is depres

sed  not taking 

Lexapro makes her more tired.                

 

                    fatigue                   Onset of Symptom                  

 _ months ago       

                          2015                   increasing over last coup

le of months, 

gets home from work and naps and is in bed by 8                

 

                    fatigue                   Pertinent Findings                

   cold intolerance 

                          2015                   cold all of the time     

         

 

 

                    joint complaint                   Location                  

 on both knees      

                          2015                   None                

 

                    joint complaint                   Location                  

 on both shoulders  

                          2015                   fingers- feels achy like 

she has 

the flu                

 

                    joint complaint                   Onset of Symptom          

         2 months 

ago                       2015                   intermittent sometimes st

deon

are difficult- she has been running                

 

                    joint complaint                   Pertinent Findings        

           Denies 

joint redness                   2015                   None               



 

                    depression                   Quality                   chron

ic                  

                          2014                   None                

 

                    depression                   Onset and Resolution           

        ongoing     

                          2014                   None                

 

                    depression                   Triggers                   stre

ss                  

                          2014                   None                

 

                    depression                   Alleviating Factors            

       medication   

                          2014                   None                

 

                    fatigue                   Onset and Resolution              

     ongoing        

                          2014                   None                

 

                    fatigue                   Onset of Symptom                  

 1 years ago        

                          2014                   None                

 

                    fatigue                   Limitation on Activities          

         moderately 

limits activities                   2014                   None           

    

 

                    depression                   Quality                   inter

mittent             

                          2014                   thinks depression is bett

er.                

 

                    depression                   Onset of Symptom               

    1 years ago     

                          2014                   None                

 

                    depression                   Pertinent Findings             

      Denies anxiety

                          2014                   None                

 

                    depression                   Pertinent Findings             

      Denies 

depressed mood                   2014                   mind is racing.   

            

 

                    depression                   Pertinent Findings             

      lethargy      

                          2014                   still tired, but she is m

ore tired on 

the lexapro.                

 

                    depression                   Pertinent Findings             

      poor self 

esteem                    2014                   None                

 

                    depression                   Pertinent Findings             

      Denies 

suicidal ideation                   2014                   None           

    

 

                    depression                   Pertinent Findings             

      Denies suicide

attempt/plan                   2014                   None                

 

                    depression                   Pertinent Findings             

      Denies self-

harm                      2014                   None                

 

                    depression                   Quality                   chron

ic                  

                          2014                   None                

 

                    depression                   Onset and Resolution           

        ongoing     

                          2014                   STATES LEXAPRO IS HELPING

             

  

 

                    depression                   Triggers                   stre

ss                  

                          2014                   None                

 

                    depression                   Alleviating Factors            

       medication   

                          2014                   None                

 

                    fatigue                   Onset of Symptom                  

 1 years ago        

                          2014                   None                

 

                    fatigue                   Onset and Resolution              

     ongoing        

                          2014                   None                

 

                    fatigue                   Limitation on Activities          

         moderately 

limits activities                   2014                   None           

    

 

                    depression                   Onset of Symptom               

    1 years ago     

                          2014                   None                

 

                    depression                   Pertinent Findings             

      anxiety       

                          2014                   None                

 

                    depression                   Pertinent Findings             

      depressed mood

                          2014                   None                

 

                    depression                   Pertinent Findings             

      lethargy      

                          2014                   None                

 

                    depression                   Pertinent Findings             

      poor self 

esteem                    2014                   None                

 

                    depression                   Pertinent Findings             

      Denies suicide

attempt/plan                   2014                   None                

 

                    depression                   Pertinent Findings             

      Denies 

suicidal ideation                   2014                   None           

    

 

                    depression                   Pertinent Findings             

      Denies self-

harm                      2014                   None                

 

                    depression                   Onset and Resolution           

        ongoing     

                          2014                   None                

 

                    depression                   Triggers                   stre

ss                  

                          2014                   None                

 

                    depression                   Quality                   inter

mittent             

                          2014                   None                



                                                                    



Advance Directives

          No Advance Directive data                                             
                      



Encounters

                      



                    Encounter                   Performer                   Loca

tion                

                          Codes                     Date                

 

                                                            (31664) PREV VISIT E

ST AGE 18-39

Diagnosis: Encounter for general adult medical examination with abnormal 
findings[ICD10: Z00.01]

Diagnosis: Systemic lupus erythematosus, unspecified[ICD10: M32.9]              
                          Elba Sinha MD, 

[facility] 

                          CPT-4: 81376                   2018             

   

 

                                                            [phone]) [id number] EST. P

ATIENT, LEVEL III

Diagnosis: Generalized anxiety disorder[ICD10: F41.1]

Diagnosis: Systemic lupus erythematosus, unspecified[ICD10: M32.9]              
                          Elba Sinha MD, 

[facility] 

                          CPT-4: 77447                   2016             

   

 

                                                            15767 EST. PATIENT, 

LEVEL III

Diagnosis: Enlarged lymph nodes, unspecified[ICD10: R59.9]

Diagnosis: Gastro-esophageal reflux disease without esophagitis[ICD10: K21.9]   
                                 Pat Sinha MD, [facility]                   CPT-4: 76929                   2015             

  

 

                                                            [id number]) 54275 EST. P

ATIENT, LEVEL I

Diagnosis: DYSURIA[ICD9: 788.1]                                     Vicky Sinha MD, [facility]                   CPT-4: 64698

                                        2015                

 

                                                            [id number]) 19355 EST. P

ATIENT, LEVEL III

Diagnosis: Lupus[ICD9: 710.0]

Diagnosis: Fatigue[ICD9: 780.79]

Diagnosis: Depressive disorder[ICD9: 311]                                     

Vicky Sinha MD, [facility]                   CPT-4:

09390                                   2015                

 

                                                            [phone]) 55528 EST. P

ATIENT, LEVEL II

Diagnosis: Anxiety, generalized[ICD9: 300.02]

Diagnosis: DEPRESSIVE DISORDER NEC[ICD9: 311]                                   
                    Vicky Sinha MD, [facility]     

              CPT-

4: 50759                                2014                

 

                                                            [id number]) 72315 EST. P

ATIENT, LEVEL III

Diagnosis: Iron deficiency[ICD9: 280.9]

Diagnosis: DEPRESSIVE DISORDER NEC[ICD9: 311]                                   
                    Vicky Sinha MD, [facility]     

              CPT-

4: 32018                                2014                

 

                                                            (16126) OFFICE  VISI

T, [facility] - LEVEL 3

Diagnosis: Anxiety, generalized[ICD9: 300.02]

Diagnosis: Depressive disorder[ICD9: 311]

Diagnosis: Elevated blood pressure[ICD9: 796.2]

Diagnosis: Lupus[ICD9: 710.0]

Diagnosis: Fatigue[ICD9: 780.79]

Diagnosis: Hx of iron deficiency anemia[ICD9: V12.3]                            
                          Vicky Sinha MD, [facility]                 

                          CPT-4: 30728                   2014             

   



                                                                                
                                                                                
                                    



Plan of Care

                      



                    Planned Activity                   Notes                   C

odes                

                          Status                    Date                

 

                          Patient Education: Patient Medication Summary         

                          

                                        Completed                   2019  

              

 

                    Care Plan: Comp Metabolic                                   

                     

                          Pending                   2019                

 

                    Care Plan: Cbc With Differential                            

                     

                          Pending                   2019                

 

                Care Plan: Tsh                                                  

        Pending 

                                        2019                

 

                    Care Plan: C-Reactive Protein  Qnt                          

                     

                          Pending                   2019                

 

                Care Plan: Sed Rate                                             

             

Pending                                 2019                

 

                          Visit Plan:                    Well Adult - pt was cou

nseled about diet, 

exercise, and encouraged to follow a heart healthy diet and increase activity 
level. The patient was instructed to RTC yearly for well adult exams and PRN for
acute illnesses. The pt was also instructed to have yearly labs for check of 
cholesterol, thyroid, chem panel, CBC, and renal functioning. Lupus-not 
currently taking any medications-check labs

                                                            2018          

  

   

 

                                        Appointment: Elba Cardenas 

WPtel:[phone]

                                        [address] [place], [place] [address]

                                                           (30 min) Complex   

 

                                        2018                

 

                          Patient Education: Patient Medication Summary         

                          

                                        Completed                   2018  

              

 

                          Visit Plan:                    Lupus-symptoms controll

ed-check labs Anxiety-

patient off cymbalta-symptoms fairly well controlled-call if symptoms become 
uncontrolled

                                                            2016          

  

   

 

                                        Appointment: Elba Cardenas 

WPtel:[phone]

                                        [facility] [place][note 2][facility]

                                                           (30 min) Complex   

 

                                        2016                

 

                          Patient Education: Patient Medication Summary         

                          

                                        Completed                   2016  

              

 

                          Visit Plan:                    Esophageal Reflux - the

 patient has been 

counseled against excessive intake of caffeine, spicy foods, peppermint, and 
cinnamon - all of which can exacerbate esophageal reflux. The patient is to take
medications as prescribed and call the office if the symptoms are not improving.
Possible Swollen lymph node in the left posterior cervical chain. Check CBC, 
CMP, ESR, CRP.

                                                            2015          

  

   

 

                Appointment:                                                    

       (15 min) 

Moderate                                2015                

 

                          Patient Education: Patient Medication Summary         

                          

                                        Completed                   2015  

              

 

                          Visit Plan:                    UTI - pt with positive 

urinalysis - culture sent 

if appropriate. Antibiotic electronically prescribed to pt's pharmacy of choice.
Pt to call if symptoms do not improve.

                                                            2015          

  

   

 

                Appointment:                                                    

       Lab Draw 

                                        2015                

 

                          Patient Education: Patient Medication Summary         

                          

                                        Completed                   2015  

              

 

                          Visit Plan:                    Lupus - chronic - pt do

es not appear to be having

a flair at this time. Pt to call if symptoms worsen. Anxiety and Depression - 
uncontrolled - Pt has been counseled about the diagnosis of anxiety and de
pression, the potential causes, and risks associated with the diagnosis. The pt 
denies suicidal ideation, or plans. The patient has been counseled about 
treatment options, and understands the risks associated with treatment of 
depression, as well as the risks associated with NOT treating the depression. I 
believe the pt will benefit from medical intervention and an antidepressant has 
been appropriately prescribed for this patient.

                                                            2015          

  

   

 

                                        Appointment: Vicky Sinha 

WPtel:[phone]

                                        [address] [facility]

[place]                                                           Sick               

 

                                        2015                

 

                                        Appointment: Vicky Sinha 

WPtel:[phone]

                                        [facility] [place][note 2][address] [facility]

                                                           Follow up          

 

                                        2015                

 

                          Patient Education: Patient Medication Summary         

                          

                                        Completed                   2015  

              

 

                          Visit Plan:                    Chronic Depression and 

anxiety - the pt has 

symptoms of chronic anxiety and depression that have been fairly well controlled
since the last office visit. The pt has expected periods of exacerbation with 
abatement of the symptoms with change in situational exposure. No change in 
current medications.

                                                            2014          

  

   

 

                                        Appointment: Vicky Sinha 

WPtel:[phone]

                                        [facility] [place][note 2] [facility]

                                                           Follow up          

 

                                        2014                

 

                          Patient Education: Patient Medication Summary         

                          

                                        Completed                   2014  

              

 

                          Visit Plan:                    Mild iron deficiency - 

advised pt to increase 

iron in her diet. Depression and anxiety - improved - continue with lexapro.

                                                            2014          

  

   

 

                                        Appointment: Vicky Sinha 

WPtel:[phone]

                                        [address] [place], [place] [id number]

                                                           Follow up          

 

                                        2014                

 

                          Patient Education: Patient Medication Summary         

                          

                                        Completed                   2014  

              

 

                          Visit Plan:                    Elevated Blood Pressure

 - without diagnosis of 

hypertension - pt has been instructed to check blood pressure as an outpatient, 
record blood pressure and heart rate and report to the clinic in two weeks on 
the findings. Pt advised to cut back on added salt in the diet. Depression - 
uncontrolled - Pt has been counseled about the diagnosis of depression, the 
potential causes, and risks associated with the diagnosis. The pt denies 
suicidal ideation, or plans. The patient has been counseled about treatment 
options, and understands the risks associated with treatment of depression, as 
well as the risks associated with NOT treating the depression. I believe the pt 
will benefit from medical intervention and an antidepressant has been 
appropriately prescribed for this patient. Lupus - pt has history of lupus - 
currently symptoms controlled, except for elevated blood pressure, will have pt 
check blood pressures at home, monitor symptoms, and we will follow up closely. 
Elevated Blood Pressure - without diagnosis of hypertension - pt has been 
instructed to check blood pressure as an outpatient, record blood pressure and 
heart rate and report to the clinic in two weeks on the findings. Pt advised to 
cut back on added salt in the diet.

                                                            2014          

  

   

 

                                        Appointment: Vicky Sinha 

WPtel:[phone]

                                        [address] [facility]

                                                           New Patient        

 

                                        2014                

 

                          Patient Education: Patient Medication Summary         

                          

                                        Completed                   2014  

              



                                                                                
                                                                                
                                                                                
                                                       



Instructions

                      



                                        Comment                

 

                                                            . Mild iron deficien

cy - advised pt to increase iron in her 

diet.



Depression and anxiety - improved - continue with lexapro.

                                  

 

                                                            . Elevated Blood  Pr

essure - without diagnosis of 

hypertension - pt has been instructed to check blood pressure as an outpatient, 
record blood pressure and heart rate and report to the clinic in two weeks on 
the findings.  Pt advised to cut back on added salt in the diet.



Depression - uncontrolled - Pt has been counseled about the diagnosis of 
depression, the potential causes, and risks associated with the diagnosis.  The 
pt denies suicidal ideation, or plans.  The patient has been counseled about 
treatment options, and understands the risks associated with treatment of 
depression, as well as the risks associated with NOT treating the depression.

I believe the pt will benefit from medical intervention and an antidepressant 
has been appropriately prescribed for this patient.



Lupus - pt has history of lupus - currently symptoms controlled, except for 
elevated blood pressure, will have pt check blood pressures at home, monitor 
symptoms, and we will follow up closely.



Elevated Blood  Pressure - without diagnosis of hypertension - pt has been 
instructed to check blood pressure as an outpatient, record blood pressure and 
heart rate and report to the clinic in two weeks on the findings.  Pt advised to
cut back on added salt in the diet.

                                  

 

                                                            . Esophageal Reflux 

- the patient has been counseled against

excessive intake of caffeine, spicy foods, peppermint, and cinnamon - all of 
which can exacerbate esophageal reflux.

The patient is to take medications as prescribed and call the office if the 
symptoms are not improving.



Possible Swollen lymph node in the left posterior cervical chain.  Check CBC, 
CMP, ESR, CRP.

                                  

 

                                                            . Well Adult - pt wa

s counseled about diet, exercise, and 

encouraged to follow a heart healthy diet and increase activity level.  The 
patient was instructed to RTC yearly for well adult exams and PRN for acute 
illnesses.  The pt was also instructed to have yearly labs for check of 
cholesterol, thyroid, chem panel, CBC, and renal functioning.



Lupus-not currently taking any medications-check labs 

                                  

 

                                                            . Lupus - chronic - 

pt does not appear to be having a flair 

at this time.  Pt to call if symptoms worsen.



Anxiety and Depression - uncontrolled - Pt has been counseled about the 
diagnosis of anxiety and depression, the potential causes, and risks associated 
with the diagnosis.  The pt denies suicidal ideation, or plans.  The patient has
been counseled about treatment options, and understands the risks associated 
with treatment of depression, as well as the risks associated with NOT treating 
the depression.

I believe the pt will benefit from medical intervention and an antidepressant 
has been appropriately prescribed for this patient.

                                  

 

                                                            . UTI - pt with posi

tive urinalysis - culture sent if 

appropriate.  Antibiotic electronically prescribed to pt's pharmacy of choice.  
Pt to call if symptoms do not improve.

                                  

 

                                                            . 

Chronic Depression and anxiety - the pt has symptoms of chronic anxiety and 
depression that have been fairly well controlled since the last office visit.  
The pt has expected periods of exacerbation with abatement of the symptoms with 
change in situational exposure.  No change in current medications.

                                  

 

                                                            Lab work next week.



Increase water intake while taking Thrive.



Recommend miralax for constipation.





                                        . Lupus-symptoms controlled-check labs 



Anxiety-patient off cymbalta-symptoms fairly well controlled-call if symptoms 
become uncontrolled

## 2020-06-11 NOTE — XMS REPORT
Continuity of Care Document

                             Created on: 2020



Suzy Howard

External Reference #: [id number]

: 1980

Sex: Female



Demographics





                          Address                   [address], [place]  [id number]

 

                          Home Phone                [phone] x

 

                          Preferred Language        Unknown

 

                          Marital Status            Unknown

 

                          Rastafari Affiliation     Unknown

 

                          Race                      Unknown

 

                          Ethnic Group              Unknown





Author





                          Organization              Unknown

 

                          Address                   Unknown

 

                          Phone                     Unavailable



              



Allergies

      



             Active           Description           Code           Type         

  Severity   

                Reaction           Onset           Reported/Identified          

 

Relationship to Patient                 Clinical Status        

 

                Yes             NKANo Known Allergies           NKA             

Miscellaneous 

Allergy           Unknown           N/A                             2007  

      

                                                             



                  



Medications

      



There is no data.                  



Problems

      



             Date Dx Coded           Attending           Type           Code    

       

Diagnosis                               Diagnosed By        

 

           2015                       Ot           710.0                  

           

  

 

           2015                       Ot           710.0                  

           

  

 

           2015                       Ot           710.0                  

           

  



                      



Procedures

      



There is no data.                  



Results

      



                    Test                Result              Range        

 

                                        Complete blood count (CBC) with automate

d white blood cell (WBC) differential - 

20 06:04         

 

                          Blood leukocytes automated count (number/volume)      

     6.1 10*3/uL          

                                        4.3-11.0        

 

                          Blood erythrocytes automated count (number/volume)    

       4.34 10*6/uL       

                                        4.35-5.85        

 

                    Venous blood hemoglobin measurement (mass/volume)           

13.1 g/dL           

11.5-16.0        

 

                    Blood hematocrit (volume fraction)           38 %           

     35-52        

 

                    Automated erythrocyte mean corpuscular volume           88 [

foz_us]           

80-99        

 

                                        Automated erythrocyte mean corpuscular h

emoglobin (mass per erythrocyte)        

                          30 pg                     25-34        

 

                                        Automated erythrocyte mean corpuscular h

emoglobin concentration measurement 

(mass/volume)             34 g/dL                   32-36        

 

                    Automated erythrocyte distribution width ratio           14.

0 %              10.0-

14.5        

 

                    Automated blood platelet count (count/volume)           392 

10*3/uL           

130-400        

 

                          Automated blood platelet mean volume measurement      

     9.4 [foz_us]         

                                        7.4-10.4        

 

                    Automated blood neutrophils/100 leukocytes           53 %   

             42-75       

 

 

                    Automated blood lymphocytes/100 leukocytes           33 %   

             12-44       

 

 

                    Blood monocytes/100 leukocytes           12 %               

 0-12        

 

                    Automated blood eosinophils/100 leukocytes           2 %    

             0-10        

 

                    Automated blood basophils/100 leukocytes           0 %      

           0-10        

 

                    Blood neutrophils automated count (number/volume)           

3.3 10*3            

1.8-7.8        

 

                    Blood lymphocytes automated count (number/volume)           

2.0 10*3            

1.0-4.0        

 

                    Blood monocytes automated count (number/volume)           0.

7 10*3            

0.0-1.0        

 

                    Automated eosinophil count           0.1 10*3/uL           0

.0-0.3        

 

                    Automated blood basophil count (count/volume)           0.0 

10*3/uL           

0.0-0.1        

 

                                        Comprehensive metabolic panel - 20

 06:04         

 

                          Serum or plasma sodium measurement (moles/volume)     

      139 mmol/L          

                                        135-145        

 

                          Serum or plasma potassium measurement (moles/volume)  

         4.1 mmol/L       

                                        3.6-5.0        

 

                          Serum or plasma chloride measurement (moles/volume)   

        108 mmol/L        

                                                

 

                    Carbon dioxide           21 mmol/L           21-32        

 

                          Serum or plasma anion gap determination (moles/volume)

           10 mmol/L      

                                        5-14        

 

                          Serum or plasma urea nitrogen measurement (mass/volume

)           12 mg/dL      

                                        7-18        

 

                          Serum or plasma creatinine measurement (mass/volume)  

         0.99 mg/dL       

                                        0.60-1.30        

 

                    Serum or plasma urea nitrogen/creatinine mass ratio         

  12                  NRG 

       

 

                                        Serum or plasma creatinine measurement w

ith calculation of estimated glomerular 

filtration rate           >                         NRG        

 

                    Serum or plasma glucose measurement (mass/volume)           

100 mg/dL           

        

 

                    Serum or plasma calcium measurement (mass/volume)           

9.7 mg/dL           

8.5-10.1        

 

                          Serum or plasma total bilirubin measurement (mass/volu

me)           0.4 mg/dL   

                                        0.1-1.0        

 

                                        Serum or plasma alkaline phosphatase josh

surement (enzymatic activity/volume)    

                          53 U/L                            

 

                                        Serum or plasma aspartate aminotransfera

se measurement (enzymatic 

activity/volume)           20 U/L                    5-34        

 

                                        Serum or plasma alanine aminotransferase

 measurement (enzymatic activity/volume)

                          14 U/L                    0-55        

 

                    Serum or plasma protein measurement (mass/volume)           

7.7 g/dL            

6.4-8.2        

 

                    Serum or plasma albumin measurement (mass/volume)           

4.5 g/dL            

3.2-4.5        

 

                    CALCIUM CORRECTED           9.3 mg/dL           8.5-10.1    

    

 

                                        Complete urinalysis with reflex to cultu

re - 20 06:04         

 

                    Urine color determination           YELLOW              NRG 

       

 

                    Urine clarity determination           CLOUDY              NR

G        

 

                    Urine pH measurement by test strip           6.0            

     5-9        

 

                    Specific gravity of urine by test strip           >=        

          1.016-1.022     

   

 

                          Urine protein assay by test strip, semi-quantitative  

         NEGATIVE         

                                        NEGATIVE        

 

                    Urine glucose detection by automated test strip           NE

GATIVE            

NEGATIVE        

 

                          Erythrocytes detection in urine sediment by light micr

oscopy           NEGATIVE 

                                        NEGATIVE        

 

                    Urine ketones detection by automated test strip           NE

GATIVE            

NEGATIVE        

 

                    Urine nitrite detection by test strip           NEGATIVE    

        NEGATIVE    

    

 

                    Urine total bilirubin detection by test strip           NEGA

TIVE            

NEGATIVE        

 

                          Urine urobilinogen measurement by automated test strip

 (mass/volume)           

0.2 mg/dL                               < = 1.0        

 

                    Urine leukocyte esterase detection by dipstick           TRA

CE               

NEGATIVE        

 

                                        Automated urine sediment erythrocyte cou

nt by microscopy (number/high power 

field)                     [HPF]                    NRG        

 

                                        Automated urine sediment leukocyte count

 by microscopy (number/high power field)

                           [HPF]                    NRG        

 

                          Bacteria detection in urine sediment by light microsco

py           MODERATE     

                                        NRG        

 

                                        Squamous epithelial cells detection in u

rine sediment by light microscopy       

                          5-10                      NRG        

 

                          Crystals detection in urine sediment by light microsco

py           NONE         

                                        NRG        

 

                    Casts detection in urine sediment by light microscopy       

    NONE                

NRG        

 

                          Mucus detection in urine sediment by light microscopy 

          MODERATE        

                                        NRG        

 

                    Complete urinalysis with reflex to culture           YES    

             NRG        

 

                                        Serum or plasma choriogonadotropin (preg

andre test) detection - 20 06:04  

       

 

                          Serum or plasma choriogonadotropin (pregnancy test) de

tection           POSITIVE

                                        NEGATIVE        

 

                                        Serum or plasma choriogonadotropin measu

rement (units/volume) - 20 06:04  

       

 

                          Serum or plasma choriogonadotropin measurement (units/

volume)           392 

m[iU]/mL                                <5        



                        



Encounters

      



                ACCT No.           Visit Date/Time           Discharge          

 Status         

             Pt. Type           Provider           Facility           Loc./Unit 

          

Complaint        

 

                0000            06/15/2017 11:07:16           06/15/2017 23:59:5

9           [facility]  

             Outpatient                                                         

  

 

                    [id number]           2014 14:05:00           

014 23:59:59        

           [facility]           Outpatient                                             

        

   

 

             [id number]           2020 06:18:00                         

            

Document Registration                                                           

         

 

             [id number]           2012 12:18:00                         

            

Document Registration                                                           

         

 

             [id number]           2011 10:51:00                         

            

Document Registration                                                           

         

 

             [id number]           2011 16:54:00                         

            

Document Registration

## 2020-06-11 NOTE — HISTORY & PHYSICAL
History and Physical


Date Seen by Provider:  Jun 11, 2020


Time Seen by Provider:  12:48


This patient is a 39-year-old white female admitted via the emergency department

with severe right lower quadrant pain.  Evaluation included ultrasound and 

quantitative hCG pelvic exam which were all consistent with a ruptured right 

ectopic pregnancy.  The ultrasound showed right adnexal mass to the ovary as 

well as extensive complex fluid in the pelvis consistent with hematocrit of 

peritoneum.  There was no obvious pregnancy in the uterus and the endometrial 

stripe was thickened.  Patient was admitted for further evaluation and potential

surgery.  It is apparent that surgery is required.





Allergies are none





Medications 





PMH - Lupus





PSH - none





Social Hx -  - no hx drugs, ETOH - no STD's





F Hx - noncontributory





PE 


HEENT - wnl


Neck - supple, No LA, No TM


Cor - wnl


Resp - wnl


Abd - +guarding, +Rebound, + rigid --++ tender with palp





VE defer to OR








Lab and Sono per ED - c/w ruptured ectopic











Assessment and plan   39-year-old with apparent ruptured ectopic pregnancy on 

the right.  Plan is for laparoscopic evaluation.  Surgical risk complication 

recovering follow-up have been discussed.  Patient can't stand that she could 

undergo salpingectomy on right and/or left sides.  She understands and accepts 

the potential loss of fertility if that is required.  Patient is ready to 

proceed to the operating room.


Ruptured right ectopic pregnancy/acute abdomen





Allergies and Home Medications


Allergies


Coded Allergies:  


     NKANo Known Allergies (Verified  Allergy, Unknown, 7/31/07)





Home Medications


Ibuprofen 800 Mg Tablet, 800 MG PO Q6H PRN for PAIN


   Prescribed by: DARION VAZ on 6/11/20 1309


Oxycodone HCl/Acetaminophen 1 Each Tablet, 1 TAB PO Q4H


   Prescribed by: DARION VAZ on 6/11/20 1309





Patient Home Medication List


Home Medication List Reviewed:  DARION Reilly MD       Jun 11, 2020 12:52

## 2020-06-12 NOTE — ANESTHESIA-GENERAL POST-OP
General


Post Op Complications


Complications


None





Follow Up Care/Instructions


Patient Instructions


None needed.





Anesthesia/Patient Condition


Patient Condition


Pt discharged, chart reviewed,  no complaints noted, stable vital signs, no 

apparent adverse anesthesia problems.











STEF HOLMAN CRNA          Jun 12, 2020 07:13

## 2020-06-15 NOTE — PHYSICIAN QUERY CLARIFICATION
[facility] Path Diagnosis


Admission/Discharge


Admission Date: 2020 at 09:49 


Discharge Date:  2020 at 19:20


The medical record reflects the following:  presentation rt. ruptured ectopic 

pregnancy





The pathology report findings document: Lt. ovary - large hemorrhagic corput 

luteal cyst, Rt. fallopian tube - paratubal cyst








Question: Do you agree with the pathology report findings of Lt. ovary - large 

hemorrhagic corput luteal cyst, Rt. fallopian tube - paratubal cyst


? 


Please document a response in Progress Notes or Discharge Summary.





   1.  Agree with pathological diagnosis of Lt. ovary - large hemorrhagic corput

luteal cyst, Rt. fallopian tube - paratubal cyst ( ruptured ectopic pregnancy 

ruled out)


.


   2.  No - Do not agree with pathology findings of Lt. ovary - large 

hemorrhagic corput luteal cyst, Rt. fallopian tube - paratubal cyst.. (ruptured 

ectopic pregnancy ruled in)





   3.  Other, with explanation of the clinical findings.





   4.  Clinically undetermined, no explanation for the clinical findings. 





Is is appropriate for a provider to add additional documentation (addenda) to 

the record to explain the evaluation & care up to 30 days post-discharge.  See 

Joint Commission and  Patient Record Guidelines below.





The Joint Commission Chapter Record of Care, Standard; RC.01.03.01EP 1:  "The 

hospital has a written policy that required timely entry of information into the

medical record."





According to [facility] Patient Record Guidelines, ARTICLE XXI. CORRECTIONS AND

ADDENDA, Modifications (addenda amendments, corrections and retractions) should 

be made timely and no later than regulatory requirements for record completion 

(i.e. 30 days post discharge).





Official coding guidelines require coders to query the physician for agreement 

with pathology findings that occur during the encounter.  Coders are not allowed

to pull information directly from the path reports.





PHYSICIAN RESPONSE


Pathology Report Findings:  Clinically undetermined


Explanation of clincal finding


clinically consistent with tubal 


Please remember a lack of response to the above will prompt a phone page by 

CDI/Coding staff. 





In responding to this query, please exercise your independent professional 

judgment.  The purpose of this communication is to more accurately reflect the 

complexity of your patients condition. The fact that a question is asked does 

not imply that any particular answer is desired or expected.  





Thank you for your timely response to this clarification.      


   


Requestors name: Inés   





Phone # [phone]








THIS PHYSICIAN QUERY FORM IS A PERMANENT PART OF THE MEDICAL RECORD











INÉS HERNANDEZ                 Favio 15, 2020 09:18


DARION BENJAMIN MD       2020 12:40

## 2020-08-22 ENCOUNTER — HOSPITAL ENCOUNTER (OUTPATIENT)
Dept: HOSPITAL 75 - LAB | Age: 40
End: 2020-08-22
Attending: NURSE PRACTITIONER
Payer: COMMERCIAL

## 2020-08-22 DIAGNOSIS — R05: Primary | ICD-10-CM

## 2020-08-22 DIAGNOSIS — Z20.828: ICD-10-CM

## 2020-08-22 DIAGNOSIS — R51: ICD-10-CM

## 2020-08-22 DIAGNOSIS — R50.9: ICD-10-CM

## 2020-08-22 PROCEDURE — 87635 SARS-COV-2 COVID-19 AMP PRB: CPT

## 2020-08-23 NOTE — NUR
Notified patient and health department patients COVID is positive. Explained Quarantine and 
answered questions. No other needs. She reports she is feeling better.

## 2021-10-11 ENCOUNTER — HOSPITAL ENCOUNTER (OUTPATIENT)
Dept: HOSPITAL 75 - RAD | Age: 41
End: 2021-10-11
Attending: FAMILY MEDICINE
Payer: COMMERCIAL

## 2021-10-11 DIAGNOSIS — N63.13: Primary | ICD-10-CM

## 2021-10-11 DIAGNOSIS — Z80.3: ICD-10-CM

## 2021-10-11 PROCEDURE — 76642 ULTRASOUND BREAST LIMITED: CPT

## 2021-10-11 PROCEDURE — 77066 DX MAMMO INCL CAD BI: CPT

## 2021-10-11 PROCEDURE — 77062 BREAST TOMOSYNTHESIS BI: CPT

## 2021-10-11 NOTE — DIAGNOSTIC IMAGING REPORT
INDICATION: Palpable lump in the right breast as well as a right

breast density.



CORRELATION is made with diagnostic mammogram earlier the same

day.



Sonography interrogation of the area of lump in the inner right

breast was performed. This corresponds to the 5:00 location. No

solid or cystic mass is detected. In addition, lower outer right

breast was evaluated demonstrating ductal dilatation. No discrete

mass is detected.



IMPRESSION: BI-RADS Category 2



There is no sonographic abnormality at the area of palpable

abnormality in the lower inner right breast. Continued close

clinical and self breast exams are recommended to confirm

stability.



ACR BI-RADS Category 2: Benign findings.

Result letter will be mailed to the patient.

Note: At least 10% of breast cancer is not imaged by mammography.



Dictated by: 



  Dictated on workstation # [id number]

## 2021-10-11 NOTE — DIAGNOSTIC IMAGING REPORT
INDICATION: Palpable lump right breast.



No prior mammograms are available for comparison. This a baseline

study.



2-D and 3-D bilateral diagnostic mammography was performed with

CAD.



Scattered fibroglandular densities are identified bilaterally. No

abnormality is identified in the inner right breast at the area

of palpable abnormality. There is a circumscribed ovoid nodule in

the lower outer right breast approximate cm from the nipple. This

may represent a cyst. No other masses are seen. No

malignant-appearing microcalcifications are identified. Axillae

are unremarkable.



IMPRESSION: 

1. Circumscribed nodule in the lower outer right breast 8 cm from

the nipple. Further evaluation ultrasound is recommended. 

2. No mammographic abnormality is seen in the lower inner right

breast at the area of palpable abnormality. Even so, directed

sonographic interrogation of this area is recommended and will be

performed today.



BI-RADS 0



ACR BI-RADS Category 0: Incomplete. (Needs additional imaging

evaluation).

Result letter will be mailed to the patient.

Note: At least 10% of breast cancer is not imaged by mammography.



Dictated by: 



  Dictated on workstation # [id number]

## 2022-10-13 ENCOUNTER — HOSPITAL ENCOUNTER (OUTPATIENT)
Dept: HOSPITAL 75 - RAD | Age: 42
End: 2022-10-13
Attending: NURSE PRACTITIONER
Payer: COMMERCIAL

## 2022-10-13 DIAGNOSIS — S09.92XA: Primary | ICD-10-CM
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
## 2022-10-13 PROCEDURE — 70160 X-RAY EXAM OF NASAL BONES: CPT

## 2022-10-13 NOTE — DIAGNOSTIC IMAGING REPORT
EXAM: Nasal bones 3 views



INDICATION: Nasal trauma and pain.



COMPARISON: None.



FINDINGS: No fracture or malalignment of the nasal bones. No

radiopaque foreign bodies. Normal aeration of the frontal and

maxillary sinuses.



IMPRESSION: Normal nasal bone radiographs. 



Dictated by: 



  Dictated on workstation # [id number]